# Patient Record
Sex: MALE | Race: WHITE | Employment: OTHER | URBAN - METROPOLITAN AREA
[De-identification: names, ages, dates, MRNs, and addresses within clinical notes are randomized per-mention and may not be internally consistent; named-entity substitution may affect disease eponyms.]

---

## 2017-02-18 PROBLEM — J30.1 SEASONAL ALLERGIC RHINITIS DUE TO POLLEN: Status: ACTIVE | Noted: 2017-02-18

## 2017-03-10 ENCOUNTER — HOSPITAL ENCOUNTER (OUTPATIENT)
Dept: GENERAL RADIOLOGY | Age: 62
Discharge: HOME | End: 2017-03-10
Attending: FAMILY MEDICINE
Payer: COMMERCIAL

## 2017-03-10 ENCOUNTER — HOSPITAL ENCOUNTER (OUTPATIENT)
Dept: CT IMAGING | Age: 62
Discharge: HOME | End: 2017-03-10
Attending: FAMILY MEDICINE
Payer: COMMERCIAL

## 2017-03-10 DIAGNOSIS — G44.209 MUSCLE CONTRACTION HEADACHE: ICD-10-CM

## 2017-03-10 PROCEDURE — 72040 X-RAY EXAM NECK SPINE 2-3 VW: CPT

## 2017-03-10 PROCEDURE — 70450 CT HEAD/BRAIN W/O DYE: CPT

## 2018-02-28 ENCOUNTER — HOSPITAL ENCOUNTER (OUTPATIENT)
Dept: ULTRASOUND IMAGING | Age: 63
Discharge: HOME | End: 2018-02-28
Attending: FAMILY MEDICINE
Payer: COMMERCIAL

## 2018-02-28 ENCOUNTER — HOSPITAL ENCOUNTER (OUTPATIENT)
Dept: CT IMAGING | Age: 63
Discharge: HOME | End: 2018-02-28
Attending: FAMILY MEDICINE
Payer: COMMERCIAL

## 2018-02-28 DIAGNOSIS — H53.2 DIPLOPIA: ICD-10-CM

## 2018-02-28 PROCEDURE — 70450 CT HEAD/BRAIN W/O DYE: CPT

## 2018-02-28 PROCEDURE — 93880 EXTRACRANIAL BILAT STUDY: CPT

## 2018-02-28 NOTE — PROGRESS NOTES
Patient was informed of his lab results and voiced understanding. The patient stated he is interested in setting up for a MRI. I will call imaging on Friday and will set it up. Do we have the order already in his chart?

## 2018-03-16 ENCOUNTER — HOSPITAL ENCOUNTER (OUTPATIENT)
Dept: MRI IMAGING | Age: 63
Discharge: HOME | End: 2018-03-16
Attending: NURSE PRACTITIONER
Payer: COMMERCIAL

## 2018-03-16 DIAGNOSIS — H53.2 DIPLOPIA: ICD-10-CM

## 2018-03-16 PROCEDURE — 70551 MRI BRAIN STEM W/O DYE: CPT

## 2019-04-18 PROBLEM — G43.109 MIGRAINE WITH AURA AND WITHOUT STATUS MIGRAINOSUS, NOT INTRACTABLE: Status: ACTIVE | Noted: 2019-04-18

## 2019-04-18 PROBLEM — L40.9 PSORIASIS: Status: ACTIVE | Noted: 2019-04-18

## 2019-04-25 ENCOUNTER — HOSPITAL ENCOUNTER (OUTPATIENT)
Dept: MRI IMAGING | Age: 64
Discharge: HOME | End: 2019-04-25
Payer: COMMERCIAL

## 2019-04-25 DIAGNOSIS — G43.109 MIGRAINE WITH AURA AND WITHOUT STATUS MIGRAINOSUS, NOT INTRACTABLE: ICD-10-CM

## 2019-04-25 DIAGNOSIS — Q38.5 ABNORMALITY OF PALATE: ICD-10-CM

## 2019-04-25 PROCEDURE — 70544 MR ANGIOGRAPHY HEAD W/O DYE: CPT

## 2019-06-11 PROBLEM — L40.3 PALMOPLANTAR PUSTULAR PSORIASIS: Status: ACTIVE | Noted: 2019-04-18

## 2019-08-13 PROBLEM — N40.1 BPH WITH OBSTRUCTION/LOWER URINARY TRACT SYMPTOMS: Status: ACTIVE | Noted: 2019-08-13

## 2019-08-13 PROBLEM — N13.8 BPH WITH OBSTRUCTION/LOWER URINARY TRACT SYMPTOMS: Status: ACTIVE | Noted: 2019-08-13

## 2020-01-06 PROBLEM — J40 BRONCHITIS: Status: ACTIVE | Noted: 2020-01-06

## 2020-09-17 PROBLEM — G47.30 SLEEP APNEA IN ADULT: Status: ACTIVE | Noted: 2020-09-17

## 2020-12-30 PROBLEM — R40.0 SOMNOLENCE, DAYTIME: Status: ACTIVE | Noted: 2020-12-30

## 2021-02-23 PROBLEM — E66.09 CLASS 1 OBESITY DUE TO EXCESS CALORIES WITH SERIOUS COMORBIDITY AND BODY MASS INDEX (BMI) OF 31.0 TO 31.9 IN ADULT: Status: ACTIVE | Noted: 2021-02-23

## 2021-08-30 PROBLEM — L40.3 PALMOPLANTAR PUSTULAR PSORIASIS: Status: RESOLVED | Noted: 2019-04-18 | Resolved: 2021-08-30

## 2021-09-07 ENCOUNTER — HOSPITAL ENCOUNTER (OUTPATIENT)
Dept: GENERAL RADIOLOGY | Age: 66
Discharge: HOME | End: 2021-09-07
Payer: COMMERCIAL

## 2021-09-07 DIAGNOSIS — M25.561 CHRONIC PAIN OF RIGHT KNEE: ICD-10-CM

## 2021-09-07 DIAGNOSIS — G89.29 CHRONIC PAIN OF RIGHT KNEE: ICD-10-CM

## 2021-09-07 PROBLEM — M17.11 ARTHRITIS OF RIGHT KNEE: Status: ACTIVE | Noted: 2021-09-07

## 2021-09-07 PROCEDURE — 73560 X-RAY EXAM OF KNEE 1 OR 2: CPT

## 2021-10-11 NOTE — THERAPY EVALUATION
Sneha Weiss  : 1955  Payor: Keely Macedo / Plan: [place] OF [address] / Product Type: PPO /  [facility]  [address] [facility], [place] [address] [address] [facility]  [phone]   [phone]        OUTPATIENT PHYSICAL THERAPY:Initial Assessment 10/11/2021    ICD-10: Treatment Diagnosis:   M25.561 Pain in Right Knee  R26.89 Other Abnormalities of Gait  M25.661 Stiffness Right Knee    Precautions/Allergies:   Aspirin free [acetaminophen] and Lisinopril   Fall Risk Score: 2  (? 5 = High Risk)  MD Orders: Eval and Treat MEDICAL/REFERRING DIAGNOSIS:  Arthritis of right knee [M17.11]  Pain and swelling of right knee [M25.561, M25.461]   DATE OF ONSET: 6-8 months  REFERRING PHYSICIAN: Miri Maya*  RETURN PHYSICIAN APPOINTMENT: TBD by pt     ASSESSMENT:  Mr. Елена Hubbard has attended 1 physical therapy session including the initial evaluation. Pt presents with c/o R knee pain secondary to OA. Pt presents with decreased ROM, decreased strength, and associated increased pain affecting participation in functional mobility and recreational activities. Recommending skilled PT: manual therapeutic techniques (as appropriate), therapeutic exercises and activities, balance interventions, and a comprehensive home exercise program to address the current impairments, as listed above. Sneha Weiss will benefit from skilled PT (medically necessary) to address above deficits affecting participation in basic ADLs and overall functional tolerance. PROBLEM LIST (Impacting functional limitations)  1. Decreased Strength  2. Decreased ADL/Functional Activities  3. Decreased Transfer Abilities  4. Decreased Ambulation Ability/Technique  5. Decreased Balance  6. Increased Pain  7. Decreased Flexibility/Joint Mobility  8. Decreased [place] with Home Exercise Program INTERVENTIONS PLANNED:  1. Balance Exercise  2. Cold  3. Cryotherapy  4.  Electrical Stimulation  5. Family Education  6. Gait Training  7. Heat  8. Home Exercise Program (HEP)  9. Manual Therapy  10. Neuromuscular Re-education/Strengthening  11. Range of Motion (ROM)  12. Therapeutic Activites  13. Therapeutic Exercise/Strengthening  14. Transcutaneous Electrical Nerve Stimulation (TENS)  15. Transfer Training  16. Vasopneumatic Device  17. Dry Needling   TREATMENT PLAN:  TREATMENT PLAN:  Effective Dates: 10/12/2021 TO 1/9/2022 (90 days). Frequency/Duration: 2 times a week for 90 Days  GOALS: (Goals have been discussed and agreed upon with patient.)  Discharge Goals: Time Frame: 12 weeks  1. Varinder Virgen will be independent with HEP. 2. Varinder Virgen will report 2/10 pain in order to ascend/descend steps without difficulty  3. Varinder Virgen will demonstrate 0-140 degrees knee ROM in order to return to squatting without difficulty  4. Varinder Virgen will demonstrate 5/5 LE strength in order to return to hiking  1. Varinder Virgen will score 65/80 on LEFS demonstrating overall improvements in functional mobility  Rehabilitation Potential For Stated Goals: Good    Outcome Measure: Tool Used: Lower Extremity Functional Scale (LEFS)  Score:  Initial: 51/80 Most Recent: X/80 (Date: -- )   Interpretation of Score: 20 questions each scored on a 5 point scale with 0 representing \"extreme difficulty or unable to perform\" and 4 representing \"no difficulty\". The lower the score, the greater the functional disability. 80/80 represents no disability. Minimal detectable change is 9 points. Medical Necessity:   · Skilled intervention continues to be required due to decreased strength, ROM, balance, and functional mobility. Reason for Services/Other Comments:  · Patient continues to require skilled intervention due to decreased strength, balance, and ROM with increased pain affecting pt functional mobility.     Regarding Trey Hart's therapy, I certify that the treatment plan above will be carried out by a therapist or under their direction. Thank you for this referral,  Kevin Murillo     Referring Physician Signature: Breanna Flowers*              Date                    The information in this section was collected on 10/12/21 (except where otherwise noted). HISTORY:   History of Present Injury/Illness (Reason for Referral):  Pt arrives today with c/o R knee pain. Pt reports no specific MINH but that pain just progressively worsened over time. He states approximately 3 months ago he began exercises and found some relief with it but still does continue to have pain. It did not resolve completely Pt reports pain is intermittent. Pt reports having increased stiffness (difficulty with both extension and flexion). Reports most pain is medially and does have popping. Pt reports aggravating factors include any physical activity. CC/Primary Concern: R knee pain and stiffness            Treatment Side: Right    Past Medical History/Comorbidities:   Mr. Joao Pacheco  has a past medical history of Essential hypertension, benign, Mild intermittent asthma, and Sleep disturbance. Mr. Joao Pacheco  has no past surgical history on file. Social History/Living Environment:   Lives with wife, 1 story, no steps    Pain/Symptom Location: medial pain and overall joint pain    Worst Pain: 6/10  Current Pain: 0/10    Aggravating Factors: Standing, Walking, Stairs Up, Stairs Down and Sit to stand, squatting    Alleviating Factors/Positions/Motions: Exercising, middle bend (resting position)      Diagnostic Imaging: Xray: showing OA and fluid    Occupation: Working- practice law      Prior Level of Function/Work/Activity:  History of knee pain but nothing that lasted, Independent and relatively painfree    Patient Goals:   Get back normal if possible- as much improvement as possible, return to hiking.      Current Medications:       Current Outpatient Medications:     Armodafinil (NuvigiL) 150 mg tab, 1 po QAM --For Excessive Daytime Somnolence, Disp: 90 Tablet, Rfl: 1    metoprolol succinate (TOPROL-XL) 50 mg XL tablet, Take 1 Tablet by mouth daily. , Disp: 90 Tablet, Rfl: 0    rosuvastatin (CRESTOR) 5 mg tablet, TAKE 1 TABLET BY MOUTH EVERY DAY AT NIGHT, Disp: 90 Tab, Rfl: 3    olmesartan (BENICAR) 40 mg tablet, Take 1 Tab by mouth daily. , Disp: 90 Tab, Rfl: 3    albuterol (PROVENTIL HFA, VENTOLIN HFA, PROAIR HFA) 90 mcg/actuation inhaler, INHALE 2 PUFFS BY MOUTH EVERY 4 HOURS AS NEEDED FOR WHEEZE, Disp: 1 Inhaler, Rfl: 1    topiramate (TOPAMAX) 50 mg tablet, Take 1 Tab by mouth daily. Indications: migraine prevention, Disp: 90 Tab, Rfl: 3    DULoxetine (CYMBALTA) 60 mg capsule, 1 cap daily. , Disp: 90 Cap, Rfl: 3    tamsulosin (FLOMAX) 0.4 mg capsule, TAKE 1 CAPSULE BY MOUTH EVERY DAY  Indications: enlarged prostate with urination problem, Disp: 90 Cap, Rfl: 3   Date Last Reviewed:  10/12/2021       Ambulatory/Rehab Services H2 Model Falls Risk Assessment    Risk Factors:       (1)  Gender [Male] Ability to Rise from Chair:       (1)  Pushes up, successful in one attempt    Falls Prevention Plan:       No modifications necessary   Total: (5 or greater = High Risk): 2    ©2010 [facility] of [address] [place] Patent #[id number].  Federal Law prohibits the replication, distribution or use without written permission from [facility] [employer]        Number of Personal Factors/Comorbidities that affect the Plan of Care: 1-2: MODERATE COMPLEXITY   EXAMINATION:     ________________________________________________________________________________________________  Observation        Gait: Decreased Gait Speed and Decreased Stride Length        Assistive Device: None              Edema: 41.5 cm L, 42.0 cm on R         ________________________________________________________________________________________________  Range of Motion            Lower  Joint: Active Active    Right (Degrees) Left (Degrees)   Hip Flexion [facility]   [facility]   Hip Adduction     Hip Abduction [facility]   Hip Internal Rotation [facility] [place]   Hip External Rotation [facility]   Knee Flexion 125 degrees 145 degrees   Knee Extension -5 degrees 0 degrees           Additional Comments:   ________________________________________________________________________________________________  Strength                 Lower Extremity  Joint:      RIGHT LEFT   Hip Flexion 4+/5 5/5   Hip Extension 4+/5 5/5   Hip Internal Rotation 5/5 5/5   Hip External Rotation 4+/5 5/5   Hip Abduction 4+/5 5/5   Hip Adduction NT/5 NT/5   Knee Flexion 5/5 5/5   Knee Extension 5/5 5/5        Additional Comments:   ________________________________________________________________________________________________  [facility]        C/O Radicular Symptoms: No            Additional Comments:   ________________________________________________________________________________________________  Special Tests      D[place]ora: Negative        Additional Comments:     ________________________________________________________________________________________________  Palpation: No TTP this session  Joint mobilization: Hypomobility noted- R patella all directions    BALANCE Date: 10/12/2021 Date:    Right 30 seconds     Left              Body Structures Involved:    9. Nerves  10. Bones  11. Joints  12. Muscles  13. Ligaments Body Functions Affected:  18. Sensory/Pain  19. Neuromusculoskeletal  20. Movement Related Activities and Participation Affected:  1. General Tasks and Demands  2. Mobility  3. Self Care  4. Domestic Life  5. Interpersonal Interactions and Relationships  6.  Community, Social and [employer] [place]   Number of elements (examined above) that affect the Plan of Care: 4+: HIGH COMPLEXITY   CLINICAL PRESENTATION:   Presentation: Evolving clinical presentation with changing clinical characteristics: MODERATE COMPLEXITY   CLINICAL DECISION MAKING:      Use of outcome tool(s) and clinical judgement create a POC that gives a: Questionable prediction of patient's progress: MODERATE COMPLEXITY

## 2021-10-11 NOTE — PROGRESS NOTES
Gretchen Frost  : 1955  Payor: Le Zhao / [place] OF [address] / Product Type: PPO /  [facility] [facility] [place], [place] [address] [address] [facility]  [phone]   [phone]      OUTPATIENT PHYSICAL THERAPY: Daily Treatment Note 10/12/2021  Visit Count:  1    ICD-10: Treatment Diagnosis:   M25.561 Pain in Right Knee  R26.89 Other Abnormalities of Gait  M25.661 Stiffness Right Knee      Precautions/Allergies:   Aspirin free [acetaminophen] and Lisinopril     TREATMENT PLAN:  Effective Dates: 10/12/2021 TO 2022 (90 days). Frequency/Duration: 2 times a week for 90 Days        PRE-TREATMENT SYMPTOMS/COMPLAINTS:  R knee pain    MEDICATIONS REVIEWED:  10/12/2021   TREATMENT:   (In addition to Assessment/Re-Assessment sessions the following treatments were rendered)    THERAPEUTIC EXERCISE: (0 minutes):  Exercises per grid below to improve mobility, strength and balance. Required minimal visual and verbal cues to promote proper body alignment and promote proper body posture. Progressed resistance, range and complexity of movement as indicated. Date:   Date:   Date:     Activity/Exercise Parameters Parameters Parameters   bridges      ASLR      SAQ      clamshell      S/l hip abduction      Hip adduction      Step downs      Lateral tap downs      Step ups      slantboard      LAQ      SLS Foam             MANUAL THERAPY: (0 minutes): Joint mobilization, Soft tissue mobilization and Manipulation was utilized and necessary because of the patient's restricted joint motion, painful spasm, loss of articular motion and restricted motion of soft tissue.      (Used abbreviations: MET - muscle energy technique; PNF - proprioceptive neuromuscular facilitation; NMR - neuromuscular re-education; AP - anterior to posterior; PA - posterior to anterior)    MODALITIES: (0 minutes):      none      TREATMENT/SESSION ASSESSMENT:  Gretchen Frost verbalized understanding of role of PT and POC. Education: on objective findings and HEP    RECOMMENDATIONS/INTENT FOR NEXT TREATMENT SESSION: \"Next visit will focus on advancements to more challenging activities\".     PAIN: Initial: 0/10 Post Session:  1/10     [employer] Portal    Total Treatment Billable Duration:  PT Patient Time In/Time Out  Time In: 1100  Time Out: 1135  Dmitry Pereira, PT, DPT    Future Appointments   Date Time Provider Jasper Olivera   2/21/2022  8:45 AM [facility]   2/28/2022  8:30 AM Meme Maya PA [facility] P[facility]       Visit Approval Visit # Therapist initials Date A NS / Cx < 24 hr >24 hr Cx Comments    1 ABBIE 10/12/21 [x]  [] [] Initial evaluation       [] [] []        [] [] []        [] [] []        [] [] []        [] [] []        [] [] []        [] [] []        [] [] []        [] [] []        [] [] []        [] [] []        [] [] []        [] [] []        [] [] []        [] [] []        [] [] []        [] [] []

## 2021-10-12 ENCOUNTER — HOSPITAL ENCOUNTER (OUTPATIENT)
Dept: PHYSICAL THERAPY | Age: 66
Discharge: HOME | End: 2021-10-12
Attending: PHYSICIAN ASSISTANT
Payer: COMMERCIAL

## 2021-10-12 DIAGNOSIS — M25.561 PAIN AND SWELLING OF RIGHT KNEE: ICD-10-CM

## 2021-10-12 DIAGNOSIS — M17.11 ARTHRITIS OF RIGHT KNEE: ICD-10-CM

## 2021-10-12 DIAGNOSIS — M25.461 PAIN AND SWELLING OF RIGHT KNEE: ICD-10-CM

## 2021-10-12 PROCEDURE — 97162 PT EVAL MOD COMPLEX 30 MIN: CPT

## 2021-11-03 ENCOUNTER — HOSPITAL ENCOUNTER (OUTPATIENT)
Dept: PHYSICAL THERAPY | Age: 66
Discharge: HOME | End: 2021-11-03
Attending: PHYSICIAN ASSISTANT
Payer: COMMERCIAL

## 2021-11-03 PROCEDURE — 97110 THERAPEUTIC EXERCISES: CPT

## 2021-11-03 PROCEDURE — 97140 MANUAL THERAPY 1/> REGIONS: CPT

## 2021-11-03 NOTE — PROGRESS NOTES
Gretchen Jamesconcepción  : 1955  Payor: Le Zhao / [place] OF [address] / Product Type: PPO /  [facility]rapy [facility] at [facility]  [address], [place] [address] [address] [facility]  [phone]   [phone]      OUTPATIENT PHYSICAL THERAPY: Daily Treatment Note 11/3/2021  Visit Count:  2    ICD-10: Treatment Diagnosis:   M25.561 Pain in Right Knee  R26.89 Other Abnormalities of Gait  M25.661 Stiffness Right Knee      Precautions/Allergies:   Aspirin free [acetaminophen] and Lisinopril     TREATMENT PLAN:  Effective Dates: 10/12/2021 TO 2022 (90 days). Frequency/Duration: 2 times a week for 90 Days        PRE-TREATMENT SYMPTOMS/COMPLAINTS:  Pt reports knee pain mostly at medial aspect today. MEDICATIONS REVIEWED:  11/3/2021   TREATMENT:   (In addition to Assessment/Re-Assessment sessions the following treatments were rendered)    THERAPEUTIC EXERCISE: (25 minutes):  Exercises per grid below to improve mobility, strength and balance. Required minimal visual and verbal cues to promote proper body alignment and promote proper body posture. Progressed resistance, range and complexity of movement as indicated.      Date:  11/3/21 Date:   Date:     Activity/Exercise Parameters Parameters Parameters   bridges 10 X 2     ASLR 10 X 2     SAQ #2 10 X 2     clamshell 10 X 2     S/l hip abduction 10 X 2     Hip adduction 10 X 2     Step downs      Lateral tap downs      Step ups      slantboard 30 seconds 3 X      LAQ #2 10 X 2     SLS  Foam    Heel raises      Reverse clamshell 10 X 2     Standing HS Curl        MANUAL THERAPY: (15 minutes): Joint mobilization, Soft tissue mobilization and Manipulation was utilized and necessary because of the patient's restricted joint motion, painful spasm, loss of articular motion and restricted motion of soft tissue.   +manual stretching R HS, ITB Quad, hip flexor  +PROM knee flexion, extension with overpressure  +Gentle posterior glide to R tibia for improved extension    (Used abbreviations: MET - muscle energy technique; PNF - proprioceptive neuromuscular facilitation; NMR - neuromuscular re-education; AP - anterior to posterior; PA - posterior to anterior)    MODALITIES: (0 minutes):      none      TREATMENT/SESSION ASSESSMENT:  Tolerated addition of exercises well. Requiring VC and TC for technique to prevent compensations. Assess response next visit and add weight/resistance as indicated. Education: on objective findings and HEP    RECOMMENDATIONS/INTENT FOR NEXT TREATMENT SESSION: \"Next visit will focus on advancements to more challenging activities\".     PAIN: Initial: 1-2/10 Post Session:  1/10     [employer] Portal    Total Treatment Billable Duration:  PT Patient Time In/Time Out  Time In: 1105  Time Out: 1145  [place]Ã³[place]as Clawson, PT, DPT    Future Appointments   Date Time Provider Jasper Olivera   11/5/2021 11:00 AM Roxanne Delatorre PTA [facility]   11/10/2021 11:00 AM Hebron Black A [facility]   11/12/2021 11:00 AM Hebron Black A [facility]PT SFD   11/17/2021 11:00 AM Hebron Black A [facility]ORPT SFD   2/21/2022  8:45 AM PV[facility] LAB [facility]   2/28/2022  8:30 AM Jennifer Maya PA [facility] P[facility]       Visit Approval Visit # Therapist initials Date A NS / Cx < 24 hr >24 hr Cx Comments    1 ABBIE 10/12/21 [x]  [] [] Initial evaluation    2 ABBIE 11/3/21 [x] [] []        [] [] []        [] [] []        [] [] []        [] [] []        [] [] []        [] [] []        [] [] []        [] [] []        [] [] []        [] [] []        [] [] []        [] [] []        [] [] []        [] [] []        [] [] []        [] [] []

## 2021-11-05 ENCOUNTER — HOSPITAL ENCOUNTER (OUTPATIENT)
Dept: PHYSICAL THERAPY | Age: 66
Discharge: HOME | End: 2021-11-05
Attending: PHYSICIAN ASSISTANT
Payer: COMMERCIAL

## 2021-11-05 PROCEDURE — 97140 MANUAL THERAPY 1/> REGIONS: CPT

## 2021-11-05 PROCEDURE — 97110 THERAPEUTIC EXERCISES: CPT

## 2021-11-05 NOTE — PROGRESS NOTES
Dany Montes  : 1955  Payor: Yaima Barkley / [place] OF [address] / Product Type: PPO /  [facility]  [address], [place] [address] [address] [facility]  [phone]   [phone]      OUTPATIENT PHYSICAL THERAPY: Daily Treatment Note 2021  Visit Count:  3    ICD-10: Treatment Diagnosis:   M25.561 Pain in Right Knee  R26.89 Other Abnormalities of Gait  M25.661 Stiffness Right Knee      Precautions/Allergies:   Aspirin free [acetaminophen] and Lisinopril     TREATMENT PLAN:  Effective Dates: 10/12/2021 TO 2022 (90 days). Frequency/Duration: 2 times a week for 90 Days        PRE-TREATMENT SYMPTOMS/COMPLAINTS:  Pt reports knee pain mostly at medial aspect today. MEDICATIONS REVIEWED:  2021   TREATMENT:   (In addition to Assessment/Re-Assessment sessions the following treatments were rendered)    THERAPEUTIC EXERCISE: (30 minutes):  Exercises per grid below to improve mobility, strength and balance. Required minimal visual and verbal cues to promote proper body alignment and promote proper body posture. Progressed resistance, range and complexity of movement as indicated.      Date:  11/3/21 Date:  21 Date:     Activity/Exercise Parameters Parameters Parameters   bridges 10 X 2 2 x 10    ASLR 10 X 2 2 x 10     SAQ #2 10 X 2 2#  X 10     clamshell 10 X 2 2 x 10     S/l hip abduction 10 X 2 2 x 10     Hip adduction 10 X 2     Step downs      Lateral tap downs      Step ups      slantboard 30 seconds 3 X  3 x 30 sec hold     LAQ #2 10 X 2 2# 2 x 10    SLS  Foam  3 x 15 sec hold     Heel raises      Reverse clamshell 10 X 2     Tandem  stance  3 x 15-20 sec with each foot forward on blue foam.     Standing HS Curl        MANUAL THERAPY: (12 minutes): Joint mobilization, Soft tissue mobilization and Manipulation was utilized and necessary because of the patient's restricted joint motion, painful spasm, loss of articular motion and restricted motion of soft tissue.   +manual stretching R ITB, Quad, hip flexor  +PROM knee flexion, extension with overpressure  +Gentle posterior glide to R tibia for improved extension - NOT TODAY    (Used abbreviations: MET - muscle energy technique; PNF - proprioceptive neuromuscular facilitation; NMR - neuromuscular re-education; AP - anterior to posterior; PA - posterior to anterior)    MODALITIES: (0 minutes):      none  Encouraged to use once she was home . TREATMENT/SESSION ASSESSMENT:  Patient a little frustrated with balance activities. of exercises well. Requiring VC and TC for technique to prevent compensations. Assess response next visit and add weight/resistance as indicated. Education: on objective findings and HEP    RECOMMENDATIONS/INTENT FOR NEXT TREATMENT SESSION: \"Next visit will focus on advancements to more challenging activities\".     PAIN: Initial: 1-2/10 Post Session:  1/10     [employer] Portal    Total Treatment Billable Duration: 42 minutes   PT Patient Time In/Time Out  Time In: 1058  Time Out: [address] [facility], \[facility] [place] [facility]\""    Future Appointments   Date Time Provider Jasper Olivera   11/10/2021 11:00 AM [facility]   11/12/2021 11:00 AM Maris MENDIOLA [facility]PT SFD   11/17/2021 11:00 AM Maris Pickard A [facility]PT SFD   2/21/2022  8:45 AM PVF LAB [facility]   2/28/2022  8:30 AM Salas Maya PA [facility] P[facility]       Visit Approval Visit # Therapist initials Date A NS / Cx < 24 hr >24 hr Cx Comments    1 ABBIE 10/12/21 [x]  [] [] Initial evaluation    2 ABBIE 11/3/21 [x] [] []     3 PDE 11-5-2021 [x] [] []        [] [] []        [] [] []        [] [] []        [] [] []        [] [] []        [] [] []        [] [] []        [] [] []        [] [] []        [] [] []        [] [] []        [] [] []        [] [] []        [] [] []        [] [] []

## 2021-11-10 ENCOUNTER — HOSPITAL ENCOUNTER (OUTPATIENT)
Dept: PHYSICAL THERAPY | Age: 66
Discharge: HOME | End: 2021-11-10
Attending: PHYSICIAN ASSISTANT
Payer: COMMERCIAL

## 2021-11-10 PROCEDURE — 97140 MANUAL THERAPY 1/> REGIONS: CPT

## 2021-11-10 PROCEDURE — 97110 THERAPEUTIC EXERCISES: CPT

## 2021-11-10 NOTE — PROGRESS NOTES
Vika Storm  : 1955  Payor: Pro Ricci / [place]: [place] OF [address] / Product Type: PPO /  [id number]  at [facility]  [address] [facility], [place] [address] [address] [facility]  [phone]   [phone]      OUTPATIENT PHYSICAL THERAPY: Daily Treatment Note 11/10/2021  Visit Count:  4    ICD-10: Treatment Diagnosis:   M25.561 Pain in Right Knee  R26.89 Other Abnormalities of Gait  M25.661 Stiffness Right Knee      Precautions/Allergies:   Aspirin free [acetaminophen] and Lisinopril     TREATMENT PLAN:  Effective Dates: 10/12/2021 TO 2022 (90 days). Frequency/Duration: 2 times a week for 90 Days        PRE-TREATMENT SYMPTOMS/COMPLAINTS:  Pt reports his knee is might be feeling a little better but definitely not worse. MEDICATIONS REVIEWED:  11/10/2021   TREATMENT:   (In addition to Assessment/Re-Assessment sessions the following treatments were rendered)    THERAPEUTIC EXERCISE: (35 minutes):  Exercises per grid below to improve mobility, strength and balance. Required minimal visual and verbal cues to promote proper body alignment and promote proper body posture. Progressed resistance, range and complexity of movement as indicated.      Date:  11/3/21 Date:  21 Date:  11/10/21   Activity/Exercise Parameters Parameters Parameters   bridges 10 X 2 2 x 10 15 X 2   ASLR 10 X 2 2 x 10  #2 10 X 2    SAQ #2 10 X 2 2#  X 10  #3 10 X 2   clamshell 10 X 2 2 x 10  GTB 10 X 2   S/l hip abduction 10 X 2 2 x 10  10 X 2   Hip adduction 10 X 2  15 X 2   Step downs   6\" 10 X   Lateral tap downs   4\" 10 X   Step ups   4\"10 X   slantboard 30 seconds 3 X  3 x 30 sec hold  30 seconds 3 X   LAQ #2 10 X 2 2# 2 x 10 #3 10 X 2   SLS  Foam  3 x 15 sec hold  Foam 3 X w/ head turns   Heel raises      Reverse clamshell 10 X 2  #2 10 X 2   Tandem  stance  3 x 15-20 sec with each foot forward on blue foam.     Standing HS Curl        MANUAL THERAPY: (10 minutes): Joint mobilization, Soft tissue mobilization and Manipulation was utilized and necessary because of the patient's restricted joint motion, painful spasm, loss of articular motion and restricted motion of soft tissue.   +manual stretching R ITB, Quad, hip flexor  +PROM knee flexion, extension with overpressure  +Gentle posterior glide to R tibia for improved extension     (Used abbreviations: MET - muscle energy technique; PNF - proprioceptive neuromuscular facilitation; NMR - neuromuscular re-education; AP - anterior to posterior; PA - posterior to anterior)    MODALITIES: (0 minutes):      none  Encouraged to use once she was home . TREATMENT/SESSION ASSESSMENT:  Added step exercises this session with pt requiring VC and TC to prevent knee valgus and compensations. Added head turns with SLS with significant instability noted. Education: on objective findings and HEP    RECOMMENDATIONS/INTENT FOR NEXT TREATMENT SESSION: \"Next visit will focus on advancements to more challenging activities\".     PAIN: Initial: 0/10 Post Session:  0/10     [employer] Portal    Total Treatment Billable Duration:   PT Patient Time In/Time Out  Time In: 1100  Time Out: [facility]    Future Appointments   Date Time Provider Jasper Olivera   11/12/2021 11:00 AM Jessica MENDIOLA [facility]   11/17/2021 11:00 AM Jessica MENDIOLA [facility]   2/21/2022  8:45 AM PVF LAB [facility] PVD   2/28/2022  8:30 AM Bere Maya PA [facility] PVD       Visit Approval Visit # Therapist initials Date A NS / Cx < 24 hr >24 hr Cx Comments    1 ABBIE 10/12/21 [x]  [] [] Initial evaluation    2 ABBIE 11/3/21 [x] [] []     3 PDE 11-5-2021 [x] [] []     4 ABBIE 11/10/21 [x] [] []        [] [] []        [] [] []        [] [] []        [] [] []        [] [] []        [] [] []        [] [] []        [] [] []        [] [] []        [] [] []        [] [] []        [] [] []        [] [] []        [] [] []

## 2021-11-12 ENCOUNTER — HOSPITAL ENCOUNTER (OUTPATIENT)
Dept: PHYSICAL THERAPY | Age: 66
Discharge: HOME | End: 2021-11-12
Attending: PHYSICIAN ASSISTANT
Payer: COMMERCIAL

## 2021-11-12 PROCEDURE — 97110 THERAPEUTIC EXERCISES: CPT

## 2021-11-12 PROCEDURE — 97140 MANUAL THERAPY 1/> REGIONS: CPT

## 2021-11-12 NOTE — PROGRESS NOTES
Stephanie Query  : 1955  Payor: Wen García / [place] [address] / Product Type: PPO /  Therapy Center at [facility]  [address] [facility], [place] [address] [address] [facility]  [phone]   [phone]      OUTPATIENT PHYSICAL THERAPY: Daily Treatment Note 2021  Visit Count:  5    ICD-10: Treatment Diagnosis:   M25.561 Pain in Right Knee  R26.89 Other Abnormalities of Gait  M25.661 Stiffness Right Knee      Precautions/Allergies:   Aspirin free [acetaminophen] and Lisinopril     TREATMENT PLAN:  Effective Dates: 10/12/2021 TO 2022 (90 days). Frequency/Duration: 2 times a week for 90 Days        PRE-TREATMENT SYMPTOMS/COMPLAINTS:  Pt reports he feels like he is starting to get an improvement; states the step ups seemed to help last time    MEDICATIONS REVIEWED:  2021   TREATMENT:   (In addition to Assessment/Re-Assessment sessions the following treatments were rendered)    THERAPEUTIC EXERCISE: (28 minutes):  Exercises per grid below to improve mobility, strength and balance. Required minimal visual and verbal cues to promote proper body alignment and promote proper body posture. Progressed resistance, range and complexity of movement as indicated.      Date:  11/3/21 Date:  21 Date:  11/10/21 Date:  2021   Activity/Exercise Parameters Parameters Parameters    bridges 10 X 2 2 x 10 15 X 2 20 reps/1 set    ASLR 10 X 2 2 x 10  #2 10 X 2  2 lb ankle weight; 15 reps/1 set R LE; cues to move slower eccentrically   SAQ #2 10 X 2 2#  X 10  #3 10 X 2    clamshell 10 X 2 2 x 10  GTB 10 X 2    S/l hip abduction 10 X 2 2 x 10  10 X 2 1.5 ankle weight; 15 reps/1 set R LE; cues for correct alignment at LE and slow movement   Hip adduction 10 X 2  15 X 2    Step downs   6\" 10 X Off of 6 inch step; 15 reps/1 set leading with L (to increase demand at R LE) with medial patellar taping at R LE; minimal to no UE support   Lateral tap downs   4\" 10 X Step ups   4\"10 X Onto 6 inch step; 15 reps/1 set leading with R LE with medial patellar taping at R knee; minimal to no UE support   slantboard 30 seconds 3 X  3 x 30 sec hold  30 seconds 3 X    LAQ #2 10 X 2 2# 2 x 10 #3 10 X 2    SLS  Foam  3 x 15 sec hold  Foam 3 X w/ head turns    Heel raises       Reverse clamshell 10 X 2  #2 10 X 2    Tandem  stance  3 x 15-20 sec with each foot forward on blue foam.      Standing HS Curl       Prone hip extension    10 reps/1 set R LE; cues to maintain hips at table to avoid substitution   Prone knee flexion    2 lb resistance; 20 reps/1 set R LE             MANUAL THERAPY: (14 minutes): Joint mobilization, Soft tissue mobilization and Manipulation was utilized and necessary because of the patient's restricted joint motion, painful spasm, loss of articular motion and restricted motion of soft tissue.   +manual stretching R ITB, Quad, hip flexor - NOT PERFORMED on 11/12/2021  +PROM knee flexion, extension with overpressure  +Gentle posterior glide to R tibia for improved extension   +STM and trigger point release to R quad   +mediopatellar taping (with leukotape and hypafix) to R knee to improve R knee alignment    (Used abbreviations: MET - muscle energy technique; PNF - proprioceptive neuromuscular facilitation; NMR - neuromuscular re-education; AP - anterior to posterior; PA - posterior to anterior)    MODALITIES: (0 minutes):      none  Pt declined    TREATMENT/SESSION ASSESSMENT:  Performed Hopson's test this session and pt had less pain with resisted knee flexion at multiple angles when medial patellar glide was performed. Taped pt's knee into medial patellar glide to assist with alignment when performing functional mobility - pt reported decreased pain when performing step ups/downs when knee was taped. Instructed pt that he can continue to keep tape on as long as his R knee doesn't hurt.  Discussed with pt that if tape continues to help he can discuss with primary therapist next session on how to continue to improve his patellar alignment. Education: on objective findings and HEP    RECOMMENDATIONS/INTENT FOR NEXT TREATMENT SESSION: \"Next visit will focus on advancements to more challenging activities\".     PAIN: Initial: 0-1/10 Post Session:  0/10     [employer] Portal    Total Treatment Billable Duration:   PT Patient Time In/Time Out  Time In: 1100  Time Out: 1  Sh[place]ley Yusuf, DPT    Future Appointments   Date Time Provider [place]   11/17/2021 11:00 AM Fatemeh Gory A [facility] S[facility]   11/19/2021  8:45 AM Dewey Gan, [facility] SFD   11/23/2021  8:45 AM Dewey Gan, [facility] SFD   12/1/2021 11:00 AM Fatemeh Gory A [facility] SFD   12/3/2021 11:00 AM Fatemeh Gory A [facility] SFD   2/21/2022  8:45 AM PVF LAB [facility] P[facility]   2/28/2022  8:30 AM Joyce Maya, PA [facility] PVD       Visit Approval Visit # Therapist initials Date A NS / Cx < 24 hr >24 hr Cx Comments    1 ABBIE 10/12/21 [x]  [] [] Initial evaluation    2 ABBIE 11/3/21 [x] [] []     3 PDE 11-5-2021 [x] [] []     4 ABBIE 11/10/21 [x] [] []     5  11/12/21 [x] [] []        [] [] []        [] [] []        [] [] []        [] [] []        [] [] []        [] [] []        [] [] []        [] [] []        [] [] []        [] [] []        [] [] []        [] [] []        [] [] []

## 2021-11-17 ENCOUNTER — HOSPITAL ENCOUNTER (OUTPATIENT)
Dept: PHYSICAL THERAPY | Age: 66
Discharge: HOME | End: 2021-11-17
Attending: PHYSICIAN ASSISTANT
Payer: COMMERCIAL

## 2021-11-17 PROCEDURE — 97140 MANUAL THERAPY 1/> REGIONS: CPT

## 2021-11-17 PROCEDURE — 97110 THERAPEUTIC EXERCISES: CPT

## 2021-11-17 NOTE — PROGRESS NOTES
Milton Perera  : 1955  Payor: Garry Lui / [place] [address] / Product Type: PPO /  Therapy Center at [address] [place] [address] [address], [address] [facility], [place] [address] [address] [facility]  [phone]   [phone]      OUTPATIENT PHYSICAL THERAPY: Daily Treatment Note 2021  Visit Count:  6    ICD-10: Treatment Diagnosis:   M25.561 Pain in Right Knee  R26.89 Other Abnormalities of Gait  M25.661 Stiffness Right Knee      Precautions/Allergies:   Aspirin free [acetaminophen] and Lisinopril     TREATMENT PLAN:  Effective Dates: 10/12/2021 TO 2022 (90 days). Frequency/Duration: 2 times a week for 90 Days        PRE-TREATMENT SYMPTOMS/COMPLAINTS:    MEDICATIONS REVIEWED:  2021   TREATMENT:   (In addition to Assessment/Re-Assessment sessions the following treatments were rendered)    THERAPEUTIC EXERCISE: (25 minutes):  Exercises per grid below to improve mobility, strength and balance. Required minimal visual and verbal cues to promote proper body alignment and promote proper body posture. Progressed resistance, range and complexity of movement as indicated.      Date:  11/3/21 Date:  21 Date:  11/10/21 Date:  2021 Date:  21   Activity/Exercise Parameters Parameters Parameters     bridges 10 X 2 2 x 10 15 X 2 20 reps/1 set  15 X 2 W/ 10 lb weight at waist   ASLR 10 X 2 2 x 10  #2 10 X 2  2 lb ankle weight; 15 reps/1 set R LE; cues to move slower eccentrically #3 10 X 2    SAQ #2 10 X 2 2#  X 10  #3 10 X 2  #3 15 X 2   clamshell 10 X 2 2 x 10  GTB 10 X 2  GTB 10 X 2   S/l hip abduction 10 X 2 2 x 10  10 X 2 1.5 ankle weight; 15 reps/1 set R LE; cues for correct alignment at LE and slow movement #2 10 X 2   Hip adduction 10 X 2  15 X 2     Step downs   6\" 10 X Off of 6 inch step; 15 reps/1 set leading with L (to increase demand at R LE) with medial patellar taping at R LE; minimal to no UE support 6\" 10 X 1 UE support   Lateral tap downs   4\" 10 X Step ups   4\"10 X Onto 6 inch step; 15 reps/1 set leading with R LE with medial patellar taping at R knee; minimal to no UE support 6\" 10 X no UE support   slantboard 30 seconds 3 X  3 x 30 sec hold  30 seconds 3 X  30 seconds 3 X level 3    LAQ #2 10 X 2 2# 2 x 10 #3 10 X 2     SLS  Foam  3 x 15 sec hold  Foam 3 X w/ head turns     Heel raises        Reverse clamshell 10 X 2  #2 10 X 2     Tandem  stance  3 x 15-20 sec with each foot forward on blue foam.       Prone hip extension    10 reps/1 set R LE; cues to maintain hips at table to avoid substitution    Prone knee flexion    2 lb resistance; 20 reps/1 set R LE  #2 10 X 2 R             MANUAL THERAPY: (15 minutes): Joint mobilization, Soft tissue mobilization and Manipulation was utilized and necessary because of the patient's restricted joint motion, painful spasm, loss of articular motion and restricted motion of soft tissue.   +manual stretching R ITB, Quad, hip flexor   +PROM knee flexion, extension with overpressure  +Gentle posterior glide to R tibia for improved extension   +STM and trigger point release to R quad   +mediopatellar taping (with leukotape and hypafix) to R knee to improve R knee alignment    (Used abbreviations: MET - muscle energy technique; PNF - proprioceptive neuromuscular facilitation; NMR - neuromuscular re-education; AP - anterior to posterior; PA - posterior to anterior)    MODALITIES: (0 minutes):      none  Pt declined    TREATMENT/SESSION ASSESSMENT:  Tolerated progression of exercises well. Much improvement noted with knee alignment and taps downs to prevent knee valgus. Education: on objective findings and HEP    RECOMMENDATIONS/INTENT FOR NEXT TREATMENT SESSION: \"Next visit will focus on advancements to more challenging activities\".     PAIN: Initial: 0/10 Post Session:  0/10     [employer] Portal    Total Treatment Billable Duration:   PT Patient Time In/Time Out  Time In: 1100  Time Out: [address]    Future Appointments   Date Time Provider Jasper Joselin   11/19/2021  8:45 AM Sonny Davis [facility]   11/23/2021  8:45 AM Sonny Davis [facility] S[facility]   12/1/2021 11:00 AM Leslie Mosqueda A [facility] SFD   12/3/2021 11:00 AM Leslie Mosqueda A [facility] SFD   2/21/2022  8:45 AM PVF LAB [facility] P[facility]   2/28/2022  8:30 AM Meme Maya PA [facility] PVD       Visit Approval Visit # Therapist initials Date A NS / Cx < 24 hr >24 hr Cx Comments    1 ABBIE 10/12/21 [x]  [] [] Initial evaluation    2 ABBIE 11/3/21 [x] [] []     3 PDE 11-5-2021 [x] [] []     4 ABBIE 11/10/21 [x] [] []     5  11/12/21 [x] [] []     6 ABBIE 11/17/21 [x] [] []        [] [] []        [] [] []        [] [] []        [] [] []        [] [] []        [] [] []        [] [] []        [] [] []        [] [] []        [] [] []        [] [] []        [] [] []

## 2021-11-19 ENCOUNTER — HOSPITAL ENCOUNTER (OUTPATIENT)
Dept: PHYSICAL THERAPY | Age: 66
Discharge: HOME | End: 2021-11-19
Attending: PHYSICIAN ASSISTANT
Payer: COMMERCIAL

## 2021-11-19 PROCEDURE — 97110 THERAPEUTIC EXERCISES: CPT

## 2021-11-19 PROCEDURE — 97140 MANUAL THERAPY 1/> REGIONS: CPT

## 2021-11-19 NOTE — PROGRESS NOTES
1-1.9 cm Gabriel Stanley  : 1955  Payor: Gaudencio Zavala / [place] [address] / Product Type: PPO /  Therapy Center at [address] [facility] [address] [facility], [place] [address] [address] [facility]  [phone]   [phone]      OUTPATIENT PHYSICAL THERAPY: Daily Treatment Note 2021  Visit Count:  7    ICD-10: Treatment Diagnosis:   M25.561 Pain in Right Knee  R26.89 Other Abnormalities of Gait  M25.661 Stiffness Right Knee      Precautions/Allergies:   Aspirin free [acetaminophen] and Lisinopril     TREATMENT PLAN:  Effective Dates: 10/12/2021 TO 2022 (90 days). Frequency/Duration: 2 times a week for 90 Days        PRE-TREATMENT SYMPTOMS/COMPLAINTS:    MEDICATIONS REVIEWED:  2021   TREATMENT:   (In addition to Assessment/Re-Assessment sessions the following treatments were rendered)    THERAPEUTIC EXERCISE: (25 minutes):  Exercises per grid below to improve mobility, strength and balance. Required minimal visual and verbal cues to promote proper body alignment and promote proper body posture. Progressed resistance, range and complexity of movement as indicated.      Date:  21 Date:  11/10/21 Date:  2021 Date:  21 Date:  2021   Activity/Exercise Parameters Parameters      bridges 2 x 10 15 X 2 20 reps/1 set  15 X 2 W/ 10 lb weight at waist 2 x 15   10# wt at waist    ASLR 2 x 10  #2 10 X 2  2 lb ankle weight; 15 reps/1 set R LE; cues to move slower eccentrically #3 10 X 2  3#  2 x 15 R   SAQ 2#  X 10  #3 10 X 2  #3 15 X 2 3#   2 x 15 R   clamshell 2 x 10  GTB 10 X 2  GTB 10 X 2 GTB  2 x 15 R   S/l hip abduction 2 x 10  10 X 2 1.5 ankle weight; 15 reps/1 set R LE; cues for correct alignment at LE and slow movement #2 10 X 2 2#  2 x 15 R   Hip adduction  15 X 2      Step downs  6\" 10 X Off of 6 inch step; 15 reps/1 set leading with L (to increase demand at R LE) with medial patellar taping at R LE; minimal to no UE support 6\" 10 X 1 UE support Lateral tap downs  4\" 10 X      Step ups  4\"10 X Onto 6 inch step; 15 reps/1 set leading with R LE with medial patellar taping at R knee; minimal to no UE support 6\" 10 X no UE support    slantboard 3 x 30 sec hold  30 seconds 3 X  30 seconds 3 X level 3  3 x 30 sec hold    LAQ 2# 2 x 10 #3 10 X 2      SLS Foam  3 x 15 sec hold  Foam 3 X w/ head turns      Heel raises        Reverse clamshell  #2 10 X 2      Tandem  stance 3 x 15-20 sec with each foot forward on blue foam.        Prone hip extension   10 reps/1 set R LE; cues to maintain hips at table to avoid substitution  Knee bent   X 10 R  Knee straight   X 10 R   Prone knee flexion   2 lb resistance; 20 reps/1 set R LE  #2 10 X 2 R 2#   2 x 15 R             MANUAL THERAPY: (15 minutes): Joint mobilization, Soft tissue mobilization and Manipulation was utilized and necessary because of the patient's restricted joint motion, painful spasm, loss of articular motion and restricted motion of soft tissue.   +manual stretching R ITB, Quad, hip flexor   +PROM knee flexion, extension with overpressure  +Gentle posterior glide to R tibia for improved extension  - NOT TODAY  +STM and trigger point release to R quad in supine and R hamstring in prone. +mediopatellar taping (with leukotape and hypafix) to R knee to improve R knee alignment - NOT TODAY    (Used abbreviations: MET - muscle energy technique; PNF - proprioceptive neuromuscular facilitation; NMR - neuromuscular re-education; AP - anterior to posterior; PA - posterior to anterior)    MODALITIES: (0 minutes):      none  Pt declined. Encouraged him to use ice at home as he needs it for inflammation and edema. TREATMENT/SESSION ASSESSMENT:  Patient had decrease in edema pocket on medial patella region. He was able to increase reps with no difficulty as noted in grid above.      Education: on objective findings and HEP    RECOMMENDATIONS/INTENT FOR NEXT TREATMENT SESSION: \"Next visit will focus on advancements to more challenging activities\".     PAIN: Initial: 0/10 Post Session:  0/10     [employer] Portal    Total Treatment Billable Duration: 40 minutes   PT Patient Time In/Time Out  Time In: 0093 (Patient 5 minutes late )  Time Out: [address] PTA    Future Appointments   Date Time Provider Jasper Olivera   11/23/2021  8:45 AM Annia Jung PTA [facility]   12/1/2021 11:00 AM Aloha Lingo A [facility] SFD   12/3/2021 11:00 AM [place] Lingo A [facility] SFD   2/21/2022  8:45 AM PVF LAB [facility] PVD   2/28/2022  8:30 AM Abiodun Maay PA [facility] PVD       Visit Approval Visit # Therapist initials Date A NS / Cx < 24 hr >24 hr Cx Comments    1 ABBIE 10/12/21 [x]  [] [] Initial evaluation    2 ABBIE 11/3/21 [x] [] []     3 PDE 11-5-2021 [x] [] []     4 ABBIE 11/10/21 [x] [] []     5  11/12/21 [x] [] []     6 ABBIE 11/17/21 [x] [] []     7 PDE 11-19-21 [x] [] []        [] [] []        [] [] []        [] [] []        [] [] []        [] [] []        [] [] []        [] [] []        [] [] []        [] [] []        [] [] []        [] [] [] Greater than or equal to 4 cm

## 2021-11-23 ENCOUNTER — HOSPITAL ENCOUNTER (OUTPATIENT)
Dept: PHYSICAL THERAPY | Age: 66
Discharge: HOME | End: 2021-11-23
Attending: PHYSICIAN ASSISTANT
Payer: COMMERCIAL

## 2021-11-23 PROCEDURE — 97110 THERAPEUTIC EXERCISES: CPT

## 2021-11-23 PROCEDURE — 97140 MANUAL THERAPY 1/> REGIONS: CPT

## 2021-11-23 NOTE — PROGRESS NOTES
Sneha Weiss  : 1955  Payor: Keely Macedo / [place] [address] / Product Type: PPO /  Therapy Center at [facility]  [address], [place] [address] [address] [facility]  [phone]   [phone]      OUTPATIENT PHYSICAL THERAPY: Daily Treatment Note 2021  Visit Count:  8    ICD-10: Treatment Diagnosis:   M25.561 Pain in Right Knee  R26.89 Other Abnormalities of Gait  M25.661 Stiffness Right Knee      Precautions/Allergies:   Aspirin free [acetaminophen] and Lisinopril     TREATMENT PLAN:  Effective Dates: 10/12/2021 TO 2022 (90 days). Frequency/Duration: 2 times a week for 90 Days        PRE-TREATMENT SYMPTOMS/COMPLAINTS:    MEDICATIONS REVIEWED:  2021   TREATMENT:   (In addition to Assessment/Re-Assessment sessions the following treatments were rendered)    THERAPEUTIC EXERCISE: (32 minutes):  Exercises per grid below to improve mobility, strength and balance. Required minimal visual and verbal cues to promote proper body alignment and promote proper body posture. Progressed resistance, range and complexity of movement as indicated.      Date:  11/10/21 Date:  2021 Date:  21 Date:  2021 Date:  2021   Activity/Exercise Parameters       bridges 15 X 2 20 reps/1 set  15 X 2 W/ 10 lb weight at waist 2 x 15   10# wt at waist  2 x 15   10# at waist    ASLR #2 10 X 2  2 lb ankle weight; 15 reps/1 set R LE; cues to move slower eccentrically #3 10 X 2  3#  2 x 15 R 3#  2 x 15 R   SAQ #3 10 X 2  #3 15 X 2 3#   2 x 15 R 3#  2 x 15 R   clamshell GTB 10 X 2  GTB 10 X 2 GTB  2 x 15 R GTB  2 x 15 R   S/l hip abduction 10 X 2 1.5 ankle weight; 15 reps/1 set R LE; cues for correct alignment at LE and slow movement #2 10 X 2 2#  2 x 15 R 3#  2 x 10 R   Hip adduction 15 X 2       Step downs 6\" 10 X Off of 6 inch step; 15 reps/1 set leading with L (to increase demand at R LE) with medial patellar taping at R LE; minimal to no UE support 6\" 10 X 1 UE support     Lateral tap downs 4\" 10 X       Step ups 4\"10 X Onto 6 inch step; 15 reps/1 set leading with R LE with medial patellar taping at R knee; minimal to no UE support 6\" 10 X no UE support     slantboard 30 seconds 3 X  30 seconds 3 X level 3  3 x 30 sec hold  3 x 30 sec hold    LAQ #3 10 X 2       SLS Foam 3 X w/ head turns       Heel raises        Reverse clamshell #2 10 X 2       Tandem  stance        Prone hip extension  10 reps/1 set R LE; cues to maintain hips at table to avoid substitution  Knee bent   X 10 R  Knee straight   X 10 R Knee bent   X 10 R  Knee straight   X 10 R   Prone knee flexion  2 lb resistance; 20 reps/1 set R LE  #2 10 X 2 R 2#   2 x 15 R 3#   2 X 10 R   Balance board      A/P & M/L   Tapping x 20   And static hold      MANUAL THERAPY: (10 minutes): Joint mobilization, Soft tissue mobilization and Manipulation was utilized and necessary because of the patient's restricted joint motion, painful spasm, loss of articular motion and restricted motion of soft tissue.   +manual stretching R ITB, Quad, hip flexor - NOT TODAY  +PROM knee flexion, extension with overpressure  +Gentle posterior glide to R tibia for improved extension  - NOT TODAY  +STM and trigger point release to R quad in supine   +mediopatellar taping (with leukotape and hypafix) to R knee to improve R knee alignment - NOT TODAY    (Used abbreviations: MET - muscle energy technique; PNF - proprioceptive neuromuscular facilitation; NMR - neuromuscular re-education; AP - anterior to posterior; PA - posterior to anterior)    MODALITIES: (0 minutes):      none  Pt declined. Encouraged him to use ice at home as he needs it for inflammation and edema. TREATMENT/SESSION ASSESSMENT:  Patient able to perform balance board activities with no major difficulty. He reports that was a good workout. Patient able to increase weight with certain exercises as noted in grid.       Education: on objective findings and HEP    RECOMMENDATIONS/INTENT FOR NEXT TREATMENT SESSION: \"Next visit will focus on advancements to more challenging activities\".     PAIN: Initial: 0/10 Post Session:  0/10     [employer] Portal    Total Treatment Billable Duration: 42 minutes   PT Patient Time In/Time Out  Time In: [address], PTA    Future Appointments   Date Time Provider Jasper Joselin   12/1/2021 11:00 AM [facility]   12/3/2021 11:00 AM João MENDIOLA [facility] SFD   2/21/2022  8:45 AM PVF LAB [facility] PVD   2/28/2022  8:30 AM Zulma, [place], PA SSA PVF PVD       Visit Approval Visit # Therapist initials Date A NS / Cx < 24 hr >24 hr Cx Comments    1 ABBIE 10/12/21 [x]  [] [] Initial evaluation    2 ABBIE 11/3/21 [x] [] []     3 PDE 11-5-2021 [x] [] []     4 ABBIE 11/10/21 [x] [] []     5  11/12/21 [x] [] []     6 ABBIE 11/17/21 [x] [] []     7 PDE 11-19-21 [x] [] []     8 PDE 11-23-21 [x] [] []        [] [] []        [] [] []        [] [] []        [] [] []        [] [] []        [] [] []        [] [] []        [] [] []        [] [] []        [] [] []

## 2021-12-01 ENCOUNTER — HOSPITAL ENCOUNTER (OUTPATIENT)
Dept: PHYSICAL THERAPY | Age: 66
Discharge: HOME | End: 2021-12-01
Attending: PHYSICIAN ASSISTANT
Payer: COMMERCIAL

## 2021-12-01 PROCEDURE — 97110 THERAPEUTIC EXERCISES: CPT

## 2021-12-01 PROCEDURE — 97140 MANUAL THERAPY 1/> REGIONS: CPT

## 2021-12-01 NOTE — PROGRESS NOTES
Dany Montes  : 1955  Payor: Yaima Barkley / Plan: [place] BLUE CROSS OF [address] / Product Type: PPO /  [address] [place]  at [address] [facility] [address], [address], [facility], [address] [facility]  [phone]   [phone]      OUTPATIENT PHYSICAL THERAPY: Daily Treatment Note 2021  Visit Count:  9    ICD-10: Treatment Diagnosis:   M25.561 Pain in Right Knee  R26.89 Other Abnormalities of Gait  M25.661 Stiffness Right Knee      Precautions/Allergies:   Aspirin free [acetaminophen] and Lisinopril     TREATMENT PLAN:  Effective Dates: 10/12/2021 TO 2022 (90 days). Frequency/Duration: 2 times a week for 90 Days        PRE-TREATMENT SYMPTOMS/COMPLAINTS:  Pt reports he hasn't done much as far as exercises over the holiday weekend. MEDICATIONS REVIEWED:  2021   TREATMENT:   (In addition to Assessment/Re-Assessment sessions the following treatments were rendered)    THERAPEUTIC EXERCISE: (30 minutes):  Exercises per grid below to improve mobility, strength and balance. Required minimal visual and verbal cues to promote proper body alignment and promote proper body posture. Progressed resistance, range and complexity of movement as indicated.      Date:  11/10/21 Date:  2021 Date:  21 Date:  2021 Date:  2021 Date:  21   Activity/Exercise Parameters        bridges 15 X 2 20 reps/1 set  15 X 2 W/ 10 lb weight at waist 2 x 15   10# wt at waist  2 x 15   10# at waist  #10 at waist 15 X 2   ASLR #2 10 X 2  2 lb ankle weight; 15 reps/1 set R LE; cues to move slower eccentrically #3 10 X 2  3#  2 x 15 R 3#  2 x 15 R #4 10 X 2 R    SAQ #3 10 X 2  #3 15 X 2 3#   2 x 15 R 3#  2 x 15 R    clamshell GTB 10 X 2  GTB 10 X 2 GTB  2 x 15 R GTB  2 x 15 R BTB 15 X 2 R    S/l hip abduction 10 X 2 1.5 ankle weight; 15 reps/1 set R LE; cues for correct alignment at LE and slow movement #2 10 X 2 2#  2 x 15 R 3#  2 x 10 R #3 10 X 2 R    Hip adduction 15 X 2 Step downs 6\" 10 X Off of 6 inch step; 15 reps/1 set leading with L (to increase demand at R LE) with medial patellar taping at R LE; minimal to no UE support 6\" 10 X 1 UE support   6\" 10 X - no UE support    Lateral tap downs 4\" 10 X        Step ups 4\"10 X Onto 6 inch step; 15 reps/1 set leading with R LE with medial patellar taping at R knee; minimal to no UE support 6\" 10 X no UE support      slantboard 30 seconds 3 X  30 seconds 3 X level 3  3 x 30 sec hold  3 x 30 sec hold     LAQ #3 10 X 2        SLS Foam 3 X w/ head turns     Foam 2 X with head movements   Heel raises         Reverse clamshell #2 10 X 2        Prone hip extension  10 reps/1 set R LE; cues to maintain hips at table to avoid substitution  Knee bent   X 10 R  Knee straight   X 10 R Knee bent   X 10 R  Knee straight   X 10 R    Prone knee flexion  2 lb resistance; 20 reps/1 set R LE  #2 10 X 2 R 2#   2 x 15 R 3#   2 X 10 R #3 10 X 2 R    Wall squats         Leg press      #70 10 X 2 DL, #15 5X SL- with very slow eccentric control                      Balance board      A/P & M/L   Tapping x 20   And static hold       MANUAL THERAPY: (10 minutes): Joint mobilization, Soft tissue mobilization and Manipulation was utilized and necessary because of the patient's restricted joint motion, painful spasm, loss of articular motion and restricted motion of soft tissue.   +manual stretching R ITB, Quad, hip flexor , HS        (Used abbreviations: MET - muscle energy technique; PNF - proprioceptive neuromuscular facilitation; NMR - neuromuscular re-education; AP - anterior to posterior; PA - posterior to anterior)    MODALITIES: (0 minutes):      none  Pt declined. Encouraged him to use ice at home as he needs it for inflammation and edema. TREATMENT/SESSION ASSESSMENT: Tolerated progression of exercise well. No increased knee pain. Pt with poor balance with head movements.  Pt continues with difficulty with eccentric control and knee proprioception. Education: on objective findings and HEP    RECOMMENDATIONS/INTENT FOR NEXT TREATMENT SESSION: \"Next visit will focus on advancements to more challenging activities\".     PAIN: Initial: 1/10 Post Session:  0/10     [employer] Portal    Total Treatment Billable Duration:   PT Patient Time In/Time Out  Time In: 1100  Time Out: [place]    Future Appointments   Date Time Provider J[place] Joselin   12/3/2021 11:00 AM Ronda MENDIOLA [facility]   2/21/2022  8:45 AM [facility]   2/28/2022  8:30 AM Chika Maya PA [facility]       Visit Approval Visit # Therapist initials Date A NS / Cx < 24 hr >24 hr Cx Comments    1 ABBIE 10/12/21 [x]  [] [] Initial evaluation    2 ABBIE 11/3/21 [x] [] []     3 PDE 11-5-2021 [x] [] []     4 ABBIE 11/10/21 [x] [] []     5  11/12/21 [x] [] []     6 ABBIE 11/17/21 [x] [] []     7 PDE 11-19-21 [x] [] []     8 PDE 11-23-21 [x] [] []     9 ABBIE 12/1/21 [x] [] [] PN due next visit       [] [] []        [] [] []        [] [] []        [] [] []        [] [] []        [] [] []        [] [] []        [] [] []        [] [] []

## 2021-12-03 ENCOUNTER — APPOINTMENT (OUTPATIENT)
Dept: PHYSICAL THERAPY | Age: 66
End: 2021-12-03
Attending: PHYSICIAN ASSISTANT
Payer: COMMERCIAL

## 2021-12-08 ENCOUNTER — HOSPITAL ENCOUNTER (OUTPATIENT)
Dept: PHYSICAL THERAPY | Age: 66
Discharge: HOME | End: 2021-12-08
Attending: PHYSICIAN ASSISTANT
Payer: COMMERCIAL

## 2021-12-08 PROCEDURE — 97140 MANUAL THERAPY 1/> REGIONS: CPT

## 2021-12-08 PROCEDURE — 97110 THERAPEUTIC EXERCISES: CPT

## 2021-12-08 NOTE — PROGRESS NOTES
Nelda Shoulders  : 1955  Payor: Adama Chaudhry / [place]: [place]OSS OF [address] / Product Type: PPO /  [facility] Center at [facility]  [address] [address], [address], [place] [address] [address] [facility]  [phone]   [phone]      OUTPATIENT PHYSICAL THERAPY: Daily Treatment Note 2021  Visit Count:  10    ICD-10: Treatment Diagnosis:   M25.561 Pain in Right Knee  R26.89 Other Abnormalities of Gait  M25.661 Stiffness Right Knee      Precautions/Allergies:   Aspirin free [acetaminophen] and Lisinopril     TREATMENT PLAN:  Effective Dates: 10/12/2021 TO 2022 (90 days). Frequency/Duration: 2 times a week for 90 Days        PRE-TREATMENT SYMPTOMS/COMPLAINTS:  Pt reports he did have some increased pain at R knee earlier this week. Pt reports feeling improvements in pain and strength as well as balance since starting PT. MEDICATIONS REVIEWED:  2021   TREATMENT:   (In addition to Assessment/Re-Assessment sessions the following treatments were rendered)    THERAPEUTIC EXERCISE: (30 minutes):  Exercises per grid below to improve mobility, strength and balance. Required minimal visual and verbal cues to promote proper body alignment and promote proper body posture. Progressed resistance, range and complexity of movement as indicated.      Date:  11/10/21 Date:  2021 Date:  21 Date:  2021 Date:  2021 Date:  21 Date:  21   Activity/Exercise Parameters         bridges 15 X 2 20 reps/1 set  15 X 2 W/ 10 lb weight at waist 2 x 15   10# wt at waist  2 x 15   10# at waist  #10 at waist 15 X 2 #10 at waist 15 X 2   ASLR #2 10 X 2  2 lb ankle weight; 15 reps/1 set R LE; cues to move slower eccentrically #3 10 X 2  3#  2 x 15 R 3#  2 x 15 R #4 10 X 2 R  #4 15 X 2    SAQ #3 10 X 2  #3 15 X 2 3#   2 x 15 R 3#  2 x 15 R     clamshell GTB 10 X 2  GTB 10 X 2 GTB  2 x 15 R GTB  2 x 15 R BTB 15 X 2 R  BTB 15 X 2 R   S/l hip abduction 10 X 2 1.5 ankle weight; 15 reps/1 set R LE; cues for correct alignment at LE and slow movement #2 10 X 2 2#  2 x 15 R 3#  2 x 10 R #3 10 X 2 R  #3 15 X 2 R    Hip adduction 15 X 2         Step downs 6\" 10 X Off of 6 inch step; 15 reps/1 set leading with L (to increase demand at R LE) with medial patellar taping at R LE; minimal to no UE support 6\" 10 X 1 UE support   6\" 10 X - no UE support     Lateral tap downs 4\" 10 X         Step ups 4\"10 X Onto 6 inch step; 15 reps/1 set leading with R LE with medial patellar taping at R knee; minimal to no UE support 6\" 10 X no UE support       slantboard 30 seconds 3 X  30 seconds 3 X level 3  3 x 30 sec hold  3 x 30 sec hold      LAQ #3 10 X 2         SLS Foam 3 X w/ head turns     Foam 2 X with head movements Foam 3 X w/ head movements   Heel raises          Reverse clamshell #2 10 X 2         Prone hip extension  10 reps/1 set R LE; cues to maintain hips at table to avoid substitution  Knee bent   X 10 R  Knee straight   X 10 R Knee bent   X 10 R  Knee straight   X 10 R     Prone knee flexion  2 lb resistance; 20 reps/1 set R LE  #2 10 X 2 R 2#   2 x 15 R 3#   2 X 10 R #3 10 X 2 R  #4 15 X 2 R    Wall squats          Leg press      #70 10 X 2 DL, #15 5X SL- with very slow eccentric control  #70 10 X 2 DL, #15 5X SL with very slow eccentric control   Balance board      A/P & M/L   Tapping x 20   And static hold        MANUAL THERAPY: (10 minutes): Joint mobilization, Soft tissue mobilization and Manipulation was utilized and necessary because of the patient's restricted joint motion, painful spasm, loss of articular motion and restricted motion of soft tissue.   +manual stretching R ITB, Quad, hip flexor , HS        (Used abbreviations: MET - muscle energy technique; PNF - proprioceptive neuromuscular facilitation; NMR - neuromuscular re-education; AP - anterior to posterior; PA - posterior to anterior)    MODALITIES: (0 minutes):      none  Pt declined.    Encouraged him to use ice at home as he needs it for inflammation and edema. TREATMENT/SESSION ASSESSMENT: Pt is making good steady progress towards goals with improvements in strength, ROM, and balance. Focus of remaining sessions to eccentric knee control and proprioception. Education: on objective findings and HEP    RECOMMENDATIONS/INTENT FOR NEXT TREATMENT SESSION: \"Next visit will focus on advancements to more challenging activities\".     PAIN: Initial: 1/10 Post Session:  0/10 (mm fatigue)     [employer] Portal    Total Treatment Billable Duration:   PT Patient Time In/Time Out  Time In: 1100  Time Out: [address]    Future Appointments   Date Time Provider [place]   12/15/2021  9:30 AM Saurabh Merry A [facility]   12/17/2021  9:30 AM Saurabh Merry A [facility]   12/21/2021 10:15 AM Saurabh Merry A [facility]   12/28/2021  9:30 AM Saurabh Merry A [facility]   12/30/2021  1:00 PM Saurabh Merry A [facility]   1/5/2022 10:15 AM Saurabh Merry A [facility]   1/7/2022  9:30 AM Saurabh Merry A [facility]   2/21/2022  8:45 AM [facility]   2/28/2022  8:30 AM Ugo Maya PA [facility]       Visit Approval Visit # Therapist initials Date A NS / Cx < 24 hr >24 hr Cx Comments    1 ABBIE 10/12/21 [x]  [] [] Initial evaluation    2 ABBIE 11/3/21 [x] [] []     3 PDE 11-5-2021 [x] [] []     4 ABBIE 11/10/21 [x] [] []     5  11/12/21 [x] [] []     6 ABBIE 11/17/21 [x] [] []     7 PDE 11-19-21 [x] [] []     8 PDE 11-23-21 [x] [] []     9 ABBIE 12/1/21 [x] [] [] PN due next visit    10 ABBIE 12/8/21 [x] [] [] PN today       [] [] []        [] [] []        [] [] []        [] [] []        [] [] []        [] [] []        [] [] []        [] [] []

## 2021-12-08 NOTE — THERAPY EVALUATION
Lo Vega  : 1955  Payor: Roxana Barraza / Plan: [place] CROSS OF [address] / Product Type: PPO /  [address]  [facility], [address] [facility], [address]  [phone]   [phone]        OUTPATIENT PHYSICAL THERAPY:Progress Report 2021    ICD-10: Treatment Diagnosis:   M25.561 Pain in Right Knee  R26.89 Other Abnormalities of Gait  M25.661 Stiffness Right Knee    Precautions/Allergies:   Aspirin free [acetaminophen] and Lisinopril   Fall Risk Score: 2  (? 5 = High Risk)  MD Orders: Eval and Treat MEDICAL/REFERRING DIAGNOSIS:  Unilateral primary osteoarthritis, right knee [M17.11]  Pain in right knee [M25.561]  Effusion, right knee [M25.461]   DATE OF ONSET: 6-8 months  REFERRING PHYSICIAN: Miri Maya*  RETURN PHYSICIAN APPOINTMENT: TBD by pt   Progress Report: Pt has been seen for a total of 10 PT sessions to date. Pt demonstrating improvements in strength, balance and pain. Pt will continue to benefit from skilled PT interventions with focus on knee proprioception with balance and eccentric quad strength. ASSESSMENT:  Mr. Noreen Sadler has attended 1 physical therapy session including the initial evaluation. Pt presents with c/o R knee pain secondary to OA. Pt presents with decreased ROM, decreased strength, and associated increased pain affecting participation in functional mobility and recreational activities. Recommending skilled PT: manual therapeutic techniques (as appropriate), therapeutic exercises and activities, balance interventions, and a comprehensive home exercise program to address the current impairments, as listed above. Lo Vega will benefit from skilled PT (medically necessary) to address above deficits affecting participation in basic ADLs and overall functional tolerance. PROBLEM LIST (Impacting functional limitations)  1. Decreased Strength  2. Decreased ADL/Functional Activities  3.  Decreased Transfer Abilities  4. Decreased Ambulation Ability/Technique  5. Decreased Balance  6. Increased Pain  7. Decreased Flexibility/Joint Mobility  8. Decreased [place] with Home Exercise Program INTERVENTIONS PLANNED:  1. Balance Exercise  2. Cold  3. Cryotherapy  4. Electrical Stimulation  5. Family Education  6. Gait Training  7. Heat  8. Home Exercise Program (HEP)  9. Manual Therapy  10. Neuromuscular Re-education/Strengthening  11. Range of Motion (ROM)  12. Therapeutic Activites  13. Therapeutic Exercise/Strengthening  14. Transcutaneous Electrical Nerve Stimulation (TENS)  15. Transfer Training  16. Vasopneumatic Device  17. Dry Needling   TREATMENT PLAN:  TREATMENT PLAN:  Effective Dates: 10/12/2021 TO 1/9/2022 (90 days). Frequency/Duration: 2 times a week for 90 Days  GOALS: (Goals have been discussed and agreed upon with patient.)  Discharge Goals: Time Frame: 12 weeks  1. Vika Storm will be independent with HEP. Goal met 12/8/21  2. Vika Storm will report 2/10 pain in order to ascend/descend steps without difficulty Progressing 12/8/21 (3/10)  3. Vika Storm will demonstrate 0-140 degrees knee ROM in order to return to squatting without difficulty Progressing 12/8/21  4. Vika Storm will demonstrate 5/5 LE strength in order to return to hiking Goal met 12/8/21  1. Vika Storm will score 65/80 on LEFS demonstrating overall improvements in functional mobility Progressing 12/8/21  Rehabilitation Potential For Stated Goals: Good    Outcome Measure: Tool Used: Lower Extremity Functional Scale (LEFS)  Score:  Initial: 51/80 Most Recent: 5280 (Date: 12/8/21 )   Interpretation of Score: 20 questions each scored on a 5 point scale with 0 representing \"extreme difficulty or unable to perform\" and 4 representing \"no difficulty\". The lower the score, the greater the functional disability. 80/80 represents no disability. Minimal detectable change is 9 points.       Medical Necessity: · Skilled intervention continues to be required due to decreased strength, ROM, balance, and functional mobility. Reason for Services/Other Comments:  · Patient continues to require skilled intervention due to decreased strength, balance, and ROM with increased pain affecting pt functional mobility. Regarding Trey Hart's therapy, I certify that the treatment plan above will be carried out by a therapist or under their direction. Thank you for this referral,  Tosin Nails     Referring Physician Signature: Manda Payne*            The information in this section was collected on 10/12/21 (except where otherwise noted). HISTORY:   History of Present Injury/Illness (Reason for Referral):  Pt arrives today with c/o R knee pain. Pt reports no specific MINH but that pain just progressively worsened over time. He states approximately 3 months ago he began exercises and found some relief with it but still does continue to have pain. It did not resolve completely Pt reports pain is intermittent. Pt reports having increased stiffness (difficulty with both extension and flexion). Reports most pain is medially and does have popping. Pt reports aggravating factors include any physical activity. CC/Primary Concern: R knee pain and stiffness            Treatment Side: Right    Past Medical History/Comorbidities:   Mr. Yesenia Navarro  has a past medical history of Essential hypertension, benign, Mild intermittent asthma, and Sleep disturbance. Mr. Yesenia Navarro  has no past surgical history on file.   Social History/Living Environment:   Lives with wife, 1 story, no steps    Pain/Symptom Location: medial pain and overall joint pain    Worst Pain: 6/10  Current Pain: 0/10    Aggravating Factors: Standing, Walking, Stairs Up, Stairs Down and Sit to stand, squatting    Alleviating Factors/Positions/Motions: Exercising, middle bend (resting position)      Diagnostic Imaging: Xray: showing OA and fluid    Occupation: Working- practice law      Prior Level of Function/Work/Activity:  History of knee pain but nothing that lasted, Independent and relatively painfree    Patient Goals:   Get back normal if possible- as much improvement as possible, return to hiking. Current Medications:       Current Outpatient Medications:     Armodafinil (NuvigiL) 150 mg tab, 1 po QAM --For Excessive Daytime Somnolence, Disp: 90 Tablet, Rfl: 1    metoprolol succinate (TOPROL-XL) 50 mg XL tablet, Take 1 Tablet by mouth daily. , Disp: 90 Tablet, Rfl: 0    rosuvastatin (CRESTOR) 5 mg tablet, TAKE 1 TABLET BY MOUTH EVERY DAY AT NIGHT, Disp: 90 Tab, Rfl: 3    olmesartan (BENICAR) 40 mg tablet, Take 1 Tab by mouth daily. , Disp: 90 Tab, Rfl: 3    albuterol (PROVENTIL HFA, VENTOLIN HFA, PROAIR HFA) 90 mcg/actuation inhaler, INHALE 2 PUFFS BY MOUTH EVERY 4 HOURS AS NEEDED FOR WHEEZE, Disp: 1 Inhaler, Rfl: 1    topiramate (TOPAMAX) 50 mg tablet, Take 1 Tab by mouth daily. Indications: migraine prevention, Disp: 90 Tab, Rfl: 3    DULoxetine (CYMBALTA) 60 mg capsule, 1 cap daily. , Disp: 90 Cap, Rfl: 3    tamsulosin (FLOMAX) 0.4 mg capsule, TAKE 1 CAPSULE BY MOUTH EVERY DAY  Indications: enlarged prostate with urination problem, Disp: 90 Cap, Rfl: 3   Date Last Reviewed:  12/8/2021       Ambulatory/Rehab Services H2 Model Falls Risk Assessment    Risk Factors:       (1)  Gender [Male] Ability to Rise from Chair:       (1)  Pushes up, successful in one attempt    Falls Prevention Plan:       No modifications necessary   Total: (5 or greater = High Risk): 2    ©2010 [facility] [place] [address] [place] [address] Patent #[id number].  Federal Law prohibits the replication, distribution or use without written permission from [facility]        Number of Personal Factors/Comorbidities that affect the Plan of Care: 1-2: MODERATE COMPLEXITY   EXAMINATION: ________________________________________________________________________________________________  Observation        Gait: Decreased Gait Speed and Decreased Stride Length        Assistive Device: None              Edema: 41.5 cm L, 42.0 cm on R         ________________________________________________________________________________________________  Range of Motion            Lower  Joint: Active Active Active Right 12/8/21    Right (Degrees) Left (Degrees)    Hip Flexion [facility]    [facility]ip Extension [facility]    Hip Adduction      Hip Abduction [place]    Hip Internal Rotation [place] [place]    Hip External Rotation [place]    Knee Flexion 125 degrees 145 degrees 132 degrees   Knee Extension -5 degrees 0 degrees -5 degrees           Additional Comments:   ________________________________________________________________________________________________  Strength                 Lower Extremity  Joint:   Right 12/8/21    RIGHT LEFT    Hip Flexion 4+/5 5/5 5/5    Hip Extension 4+/5 5/5 5/5   Hip Internal Rotation 5/5 5/5 5/5   Hip External Rotation 4+/5 5/5 5/5   Hip Abduction 4+/5 5/5 5/5   Hip Adduction NT/5 NT/5    Knee Flexion 5/5 5/5 5/5   Knee Extension 5/5 5/5 5/5        Additional Comments:   ________________________________________________________________________________________________  [facility]er[facility]        C/O Radicular Symptoms: No            Additional Comments:   ________________________________________________________________________________________________  Special Tests      [place]: Negative        Additional Comments:     ________________________________________________________________________________________________  Palpation: No TTP this session  Joint mobilization: Hypomobility noted- R patella all directions    BALANCE Date: 10/12/2021 Date: 12/8/21   Right 30 seconds  Dynamic  Balance- up to 5 seconds with visual involement   Left              Body Structures Involved:    9. Nerves  10. Bones  11. Joints  12. Muscles  13. Ligaments Body Functions Affected:  18. Sensory/Pain  19. Neuromusculoskeletal  20. Movement Related Activities and Participation Affected:  1. General Tasks and Demands  2. Mobility  3. Self Care  4. Domestic Life  5. Interpersonal Interactions and Relationships  6.  Community, Social and [place]   Number of elements (examined above) that affect the Plan of Care: 4+: HIGH COMPLEXITY   CLINICAL PRESENTATION:   Presentation: Evolving clinical presentation with changing clinical characteristics: MODERATE COMPLEXITY   CLINICAL DECISION MAKING:      Use of outcome tool(s) and clinical judgement create a POC that gives a: Questionable prediction of patient's progress: MODERATE COMPLEXITY

## 2021-12-15 ENCOUNTER — HOSPITAL ENCOUNTER (OUTPATIENT)
Dept: PHYSICAL THERAPY | Age: 66
Discharge: HOME | End: 2021-12-15
Attending: PHYSICIAN ASSISTANT
Payer: COMMERCIAL

## 2021-12-15 PROCEDURE — 97110 THERAPEUTIC EXERCISES: CPT

## 2021-12-15 PROCEDURE — 97140 MANUAL THERAPY 1/> REGIONS: CPT

## 2021-12-15 NOTE — PROGRESS NOTES
Inez Counts  : 1955  Payor: Andrew Oconnell / [place] OF [address] / Product Type: PPO /  [facility]  [address], [place] [address] [address] [facility]  [phone]   [phone]      OUTPATIENT PHYSICAL THERAPY: Daily Treatment Note 12/15/2021  Visit Count:  11    ICD-10: Treatment Diagnosis:   M25.561 Pain in Right Knee  R26.89 Other Abnormalities of Gait  M25.661 Stiffness Right Knee      Precautions/Allergies:   Aspirin free [acetaminophen] and Lisinopril     TREATMENT PLAN:  Effective Dates: 10/12/2021 TO 2022 (90 days). Frequency/Duration: 2 times a week for 90 Days        PRE-TREATMENT SYMPTOMS/COMPLAINTS:  Pt reports no pain at his knee and that it has been feeling really good. MEDICATIONS REVIEWED:  12/15/2021   TREATMENT:   (In addition to Assessment/Re-Assessment sessions the following treatments were rendered)    THERAPEUTIC EXERCISE: (30 minutes):  Exercises per grid below to improve mobility, strength and balance. Required minimal visual and verbal cues to promote proper body alignment and promote proper body posture. Progressed resistance, range and complexity of movement as indicated.      Date:  11/10/21 Date:  2021 Date:  21 Date:  2021 Date:  2021 Date:  21 Date:  21 Date:  12/15/21   Activity/Exercise Parameters          bridges 15 X 2 20 reps/1 set  15 X 2 W/ 10 lb weight at waist 2 x 15   10# wt at waist  2 x 15   10# at waist  #10 at waist 15 X 2 #10 at waist 15 X 2 #10 at waist 30 X    ASLR #2 10 X 2  2 lb ankle weight; 15 reps/1 set R LE; cues to move slower eccentrically #3 10 X 2  3#  2 x 15 R 3#  2 x 15 R #4 10 X 2 R  #4 15 X 2  #4 15 X 2   clamshell GTB 10 X 2  GTB 10 X 2 GTB  2 x 15 R GTB  2 x 15 R BTB 15 X 2 R  BTB 15 X 2 R BTB 15 X 2   S/l hip abduction 10 X 2 1.5 ankle weight; 15 reps/1 set R LE; cues for correct alignment at LE and slow movement #2 10 X 2 2#  2 x 15 R 3#  2 x 10 R #3 10 X 2 R  #3 15 X 2 R  #4 10 X 2   Step downs 6\" 10 X Off of 6 inch step; 15 reps/1 set leading with L (to increase demand at R LE) with medial patellar taping at R LE; minimal to no UE support 6\" 10 X 1 UE support   6\" 10 X - no UE support   6\" 10 X - no UE support   Step ups 4\"10 X Onto 6 inch step; 15 reps/1 set leading with R LE with medial patellar taping at R knee; minimal to no UE support 6\" 10 X no UE support        slantboard 30 seconds 3 X  30 seconds 3 X level 3  3 x 30 sec hold  3 x 30 sec hold       SLS Foam 3 X w/ head turns     Foam 2 X with head movements Foam 3 X w/ head movements Foam 3 X w/ head movements   Prone knee flexion  2 lb resistance; 20 reps/1 set R LE  #2 10 X 2 R 2#   2 x 15 R 3#   2 X 10 R #3 10 X 2 R  #4 15 X 2 R  #4 15 X 2    Wall squats           Leg press      #70 10 X 2 DL, #15 5X SL- with very slow eccentric control  #70 10 X 2 DL, #15 5X SL with very slow eccentric control #80 10 X 2 DL, #15 5 X SL with slow eccentric control     MANUAL THERAPY: (10 minutes): Joint mobilization, Soft tissue mobilization and Manipulation was utilized and necessary because of the patient's restricted joint motion, painful spasm, loss of articular motion and restricted motion of soft tissue.   +manual stretching R ITB, Quad, hip flexor , HS        (Used abbreviations: MET - muscle energy technique; PNF - proprioceptive neuromuscular facilitation; NMR - neuromuscular re-education; AP - anterior to posterior; PA - posterior to anterior)    MODALITIES: (0 minutes):      none  Pt declined. Encouraged him to use ice at home as he needs it for inflammation and edema. TREATMENT/SESSION ASSESSMENT: Much improved proprioception and control with tap downs at R knee today. Tolerated progression of exercises well.      Education: on objective findings and HEP    RECOMMENDATIONS/INTENT FOR NEXT TREATMENT SESSION: \"Next visit will focus on advancements to more challenging activities\".     PAIN: Initial: 0/10 Post Session:  0/10 (mm fatigue)     MedBridge Portal    Total Treatment Billable Duration:   PT Patient Time In/Time Out  Time In: 0930  Time Out: [address]    Future Appointments   Date Time Provider [place]   12/17/2021  9:30 AM Veleta Horowitz A [facility]   12/21/2021 10:15 AM Veleta Horowitz A [facility]   12/28/2021  9:30 AM Veleta Horowitz A [facility]   12/30/2021  1:00 PM Veleta Horowitz A [facility]   1/5/2022 10:15 AM Veleta Horowitz A [facility]   1/7/2022  9:30 AM Veleta Horowitz A [facility]   2/21/2022  8:45 AM PV[facility]   2/28/2022  8:30 AM Kiara Maya PA [facility]       Visit Approval Visit # Therapist initials Date A NS / Cx < 24 hr >24 hr Cx Comments    1 [facility] 10/12/21 [x]  [] [] Initial evaluation    2 ABBIE 11/3/21 [x] [] []     3 PDE 11-5-2021 [x] [] []     4 ABBIE 11/10/21 [x] [] []     5  11/12/21 [x] [] []     6 ABBIE 11/17/21 [x] [] []     7 PDE 11-19-21 [x] [] []     8 PDE 11-23-21 [x] [] []     9 ABBIE 12/1/21 [x] [] [] PN due next visit    10 ABBIE 12/8/21 [x] [] [] PN today    11 ABBIE 12/15/21 [x] [] [] 1       [] [] []        [] [] []        [] [] []        [] [] []        [] [] []        [] [] []        [] [] []

## 2021-12-17 ENCOUNTER — HOSPITAL ENCOUNTER (OUTPATIENT)
Dept: PHYSICAL THERAPY | Age: 66
Discharge: HOME | End: 2021-12-17
Attending: PHYSICIAN ASSISTANT
Payer: COMMERCIAL

## 2021-12-17 PROCEDURE — 97140 MANUAL THERAPY 1/> REGIONS: CPT

## 2021-12-17 PROCEDURE — 97110 THERAPEUTIC EXERCISES: CPT

## 2021-12-17 NOTE — PROGRESS NOTES
Brit Lazo  : 1955  Payor: Johan Doss / [place] [address] / Product Type: PPO /  [facility]  [address], [address], [place] [address] [address] [facility]  [phone]   [phone]      OUTPATIENT PHYSICAL THERAPY: Daily Treatment Note 2021  Visit Count:  12    ICD-10: Treatment Diagnosis:   M25.561 Pain in Right Knee  R26.89 Other Abnormalities of Gait  M25.661 Stiffness Right Knee      Precautions/Allergies:   Aspirin free [acetaminophen] and Lisinopril     TREATMENT PLAN:  Effective Dates: 10/12/2021 TO 2022 (90 days). Frequency/Duration: 2 times a week for 90 Days        PRE-TREATMENT SYMPTOMS/COMPLAINTS:  Pt reports slight dull aching pain at R knee today. MEDICATIONS REVIEWED:  2021   TREATMENT:   (In addition to Assessment/Re-Assessment sessions the following treatments were rendered)    THERAPEUTIC EXERCISE: (30 minutes):  Exercises per grid below to improve mobility, strength and balance. Required minimal visual and verbal cues to promote proper body alignment and promote proper body posture. Progressed resistance, range and complexity of movement as indicated.      Date:  11/10/21 Date:  2021 Date:  21 Date:  2021 Date:  2021 Date:  21 Date:  21 Date:  12/15/21 Date:  21   Activity/Exercise Parameters           bridges 15 X 2 20 reps/1 set  15 X 2 W/ 10 lb weight at waist 2 x 15   10# wt at waist  2 x 15   10# at waist  #10 at waist 15 X 2 #10 at waist 15 X 2 #10 at waist 30 X  #10 at waist 30 X   ASLR #2 10 X 2  2 lb ankle weight; 15 reps/1 set R LE; cues to move slower eccentrically #3 10 X 2  3#  2 x 15 R 3#  2 x 15 R #4 10 X 2 R  #4 15 X 2  #4 15 X 2 #4 15 X 2   clamshell GTB 10 X 2  GTB 10 X 2 GTB  2 x 15 R GTB  2 x 15 R BTB 15 X 2 R  BTB 15 X 2 R BTB 15 X 2 BTB 15 X 2   S/l hip abduction 10 X 2 1.5 ankle weight; 15 reps/1 set R LE; cues for correct alignment at LE and slow movement #2 10 X 2 2#  2 x 15 R 3#  2 x 10 R #3 10 X 2 R  #3 15 X 2 R  #4 10 X 2 #4 15 X 2   Step downs 6\" 10 X Off of 6 inch step; 15 reps/1 set leading with L (to increase demand at R LE) with medial patellar taping at R LE; minimal to no UE support 6\" 10 X 1 UE support   6\" 10 X - no UE support   6\" 10 X - no UE support 6\" 10 X no UE support    Step ups 4\"10 X Onto 6 inch step; 15 reps/1 set leading with R LE with medial patellar taping at R knee; minimal to no UE support 6\" 10 X no UE support         slantboard 30 seconds 3 X  30 seconds 3 X level 3  3 x 30 sec hold  3 x 30 sec hold        SLS Foam 3 X w/ head turns     Foam 2 X with head movements Foam 3 X w/ head movements Foam 3 X w/ head movements Foam 3 X w/ head movements   Prone knee flexion  2 lb resistance; 20 reps/1 set R LE  #2 10 X 2 R 2#   2 x 15 R 3#   2 X 10 R #3 10 X 2 R  #4 15 X 2 R  #4 15 X 2  #4 15 X 2   Wall squats         10 X    Leg press      #70 10 X 2 DL, #15 5X SL- with very slow eccentric control  #70 10 X 2 DL, #15 5X SL with very slow eccentric control #80 10 X 2 DL, #15 5 X SL with slow eccentric control #85 10 X 2 DL, #15 5X SL with slow eccentric control. MANUAL THERAPY: (10 minutes): Joint mobilization, Soft tissue mobilization and Manipulation was utilized and necessary because of the patient's restricted joint motion, painful spasm, loss of articular motion and restricted motion of soft tissue.   +manual stretching R ITB, Quad, hip flexor , HS        (Used abbreviations: MET - muscle energy technique; PNF - proprioceptive neuromuscular facilitation; NMR - neuromuscular re-education; AP - anterior to posterior; PA - posterior to anterior)    MODALITIES: (0 minutes):      none  Pt declined. Encouraged him to use ice at home as he needs it for inflammation and edema. TREATMENT/SESSION ASSESSMENT: Continued improvements noted proprioception and control with tap downs at R knee and dynamic SLS.  Tolerated progression of exercises well. Education: on objective findings and HEP    RECOMMENDATIONS/INTENT FOR NEXT TREATMENT SESSION: \"Next visit will focus on advancements to more challenging activities\".     PAIN: Initial: 1/10 Post Session:  0/10 (mm fatigue)     [employer] Portal    Total Treatment Billable Duration:   PT Patient Time In/Time Out  Time In: 0930  Time Out: [address]    Future Appointments   Date Time Provider [place]   12/21/2021 10:15 AM Kingston Cheng A [facility]   12/28/2021  9:30 AM Kingston Cheng A [facility]   12/30/2021  1:00 PM Kingston Cheng A [facility]   1/5/2022 10:15 AM Kingston Cheng A [facility]   1/7/2022  9:30 AM Kingston Cheng A [facility]   2/21/2022  8:45 AM [facility]   2/28/2022  8:30 AM Terra Maya PA [facility]       Visit Approval Visit # Therapist initials Date A NS / Cx < 24 hr >24 hr Cx Comments    1 ABBIE 10/12/21 [x]  [] [] Initial evaluation    2 ABBIE 11/3/21 [x] [] []     3 PDE 11-5-2021 [x] [] []     4 ABBIE 11/10/21 [x] [] []     5  11/12/21 [x] [] []     6 ABBIE 11/17/21 [x] [] []     7 PDE 11-19-21 [x] [] []     8 PDE 11-23-21 [x] [] []     9 ABBIE 12/1/21 [x] [] [] PN due next visit    10 ABBIE 12/8/21 [x] [] [] PN today    11 ABBIE 12/15/21 [x] [] [] 1    12 ABBIE 12/17/21 [x] [] [] 2       [] [] []        [] [] []        [] [] []        [] [] []        [] [] []        [] [] []

## 2021-12-21 ENCOUNTER — HOSPITAL ENCOUNTER (OUTPATIENT)
Dept: PHYSICAL THERAPY | Age: 66
Discharge: HOME | End: 2021-12-21
Attending: PHYSICIAN ASSISTANT
Payer: COMMERCIAL

## 2021-12-21 PROCEDURE — 97110 THERAPEUTIC EXERCISES: CPT

## 2021-12-21 PROCEDURE — 97140 MANUAL THERAPY 1/> REGIONS: CPT

## 2021-12-21 NOTE — PROGRESS NOTES
Gabriel Stanley  : 1955  Payor: Gaudencio Zavala / [place] [address] / Product Type: PPO /  [facility], [address], [place] [address] [address] [facility]  [phone]   [phone]      OUTPATIENT PHYSICAL THERAPY: Daily Treatment Note 2021  Visit Count:  13    ICD-10: Treatment Diagnosis:   M25.561 Pain in Right Knee  R26.89 Other Abnormalities of Gait  M25.661 Stiffness Right Knee      Precautions/Allergies:   Aspirin free [acetaminophen] and Lisinopril     TREATMENT PLAN:  Effective Dates: 10/12/2021 TO 2022 (90 days). Frequency/Duration: 2 times a week for 90 Days        PRE-TREATMENT SYMPTOMS/COMPLAINTS:  Pt reports some increased soreness at medial aspect of knee. MEDICATIONS REVIEWED:  2021   TREATMENT:   (In addition to Assessment/Re-Assessment sessions the following treatments were rendered)    THERAPEUTIC EXERCISE: (30 minutes):  Exercises per grid below to improve mobility, strength and balance. Required minimal visual and verbal cues to promote proper body alignment and promote proper body posture. Progressed resistance, range and complexity of movement as indicated.      Date:  11/10/21 Date:  2021 Date:  21 Date:  2021 Date:  2021 Date:  21 Date:  21 Date:  12/15/21 Date:  21 Date:  21   Activity/Exercise Parameters            bridges 15 X 2 20 reps/1 set  15 X 2 W/ 10 lb weight at waist 2 x 15   10# wt at waist  2 x 15   10# at waist  #10 at waist 15 X 2 #10 at waist 15 X 2 #10 at waist 30 X  #10 at waist 30 X #10 at waist 30 X    ASLR #2 10 X 2  2 lb ankle weight; 15 reps/1 set R LE; cues to move slower eccentrically #3 10 X 2  3#  2 x 15 R 3#  2 x 15 R #4 10 X 2 R  #4 15 X 2  #4 15 X 2 #4 15 X 2 #4 15 X 2   clamshell GTB 10 X 2  GTB 10 X 2 GTB  2 x 15 R GTB  2 x 15 R BTB 15 X 2 R  BTB 15 X 2 R BTB 15 X 2 BTB 15 X 2 BTB 15 X 2   S/l hip abduction 10 X 2 1.5 ankle weight; 15 reps/1 set R LE; cues for correct alignment at LE and slow movement #2 10 X 2 2#  2 x 15 R 3#  2 x 10 R #3 10 X 2 R  #3 15 X 2 R  #4 10 X 2 #4 15 X 2 #4 15 X 2   Step downs 6\" 10 X Off of 6 inch step; 15 reps/1 set leading with L (to increase demand at R LE) with medial patellar taping at R LE; minimal to no UE support 6\" 10 X 1 UE support   6\" 10 X - no UE support   6\" 10 X - no UE support 6\" 10 X no UE support     Step ups 4\"10 X Onto 6 inch step; 15 reps/1 set leading with R LE with medial patellar taping at R knee; minimal to no UE support 6\" 10 X no UE support          slantboard 30 seconds 3 X  30 seconds 3 X level 3  3 x 30 sec hold  3 x 30 sec hold         SLS Foam 3 X w/ head turns     Foam 2 X with head movements Foam 3 X w/ head movements Foam 3 X w/ head movements Foam 3 X w/ head movements Foam 3 X w/ head movements   Prone knee flexion  2 lb resistance; 20 reps/1 set R LE  #2 10 X 2 R 2#   2 x 15 R 3#   2 X 10 R #3 10 X 2 R  #4 15 X 2 R  #4 15 X 2  #4 15 X 2 #5 10 X 2   Wall squats         10 X  10 X 2   Leg press      #70 10 X 2 DL, #15 5X SL- with very slow eccentric control  #70 10 X 2 DL, #15 5X SL with very slow eccentric control #80 10 X 2 DL, #15 5 X SL with slow eccentric control #85 10 X 2 DL, #15 5X SL with slow eccentric control. #85 10 X 2 DL, #15 5 X SL     MANUAL THERAPY: (10 minutes): Joint mobilization, Soft tissue mobilization and Manipulation was utilized and necessary because of the patient's restricted joint motion, painful spasm, loss of articular motion and restricted motion of soft tissue.   +manual stretching R ITB, Quad, hip flexor , HS        (Used abbreviations: MET - muscle energy technique; PNF - proprioceptive neuromuscular facilitation; NMR - neuromuscular re-education; AP - anterior to posterior; PA - posterior to anterior)    MODALITIES: (0 minutes):      none  Pt declined. Encouraged him to use ice at home as he needs it for inflammation and edema. TREATMENT/SESSION ASSESSMENT: Continued improvements noted proprioception and control with tap downs at R knee and dynamic SLS. Tolerated progression of exercises well. Education: on objective findings and HEP    RECOMMENDATIONS/INTENT FOR NEXT TREATMENT SESSION: \"Next visit will focus on advancements to more challenging activities\".     PAIN: Initial: 1/10 (medial knee) Post Session:  0/10     Med[employer] Portal    Total Treatment Billable Duration:   PT Patient Time In/Time Out  Time In: 1020  Time Out: [address]    Future Appointments   Date Time Provider [place]   12/28/2021  9:30 AM Anne Miranda A [facility]   12/30/2021  1:00 PM Anne Miranda A [facility]   1/5/2022 10:15 AM Anne Miranda A [facility]   1/7/2022  9:30 AM Anne Miranda A [facility]   2/21/2022  8:45 AM [facility]   2/28/2022  8:30 AM Tiffany Maya, PA [facility]       Visit Approval Visit # Therapist initials Date A NS / Cx < 24 hr >24 hr Cx Comments    1 ABBIE 10/12/21 [x]  [] [] Initial evaluation    2 ABBIE 11/3/21 [x] [] []     3 PDE 11-5-2021 [x] [] []     4 ABBIE 11/10/21 [x] [] []     5  11/12/21 [x] [] []     6 ABBIE 11/17/21 [x] [] []     7 PDE 11-19-21 [x] [] []     8 PDE 11-23-21 [x] [] []     9 ABBIE 12/1/21 [x] [] [] PN due next visit    10 ABBIE 12/8/21 [x] [] [] PN today    11 ABBIE 12/15/21 [x] [] [] 1    12 ABBIE 12/17/21 [x] [] [] 2    13 ABBIE 12/21/21 [x] [] [] 3       [] [] []        [] [] []        [] [] []        [] [] []        [] [] []

## 2021-12-30 ENCOUNTER — HOSPITAL ENCOUNTER (OUTPATIENT)
Dept: PHYSICAL THERAPY | Age: 66
Discharge: HOME | End: 2021-12-30
Attending: PHYSICIAN ASSISTANT
Payer: COMMERCIAL

## 2021-12-30 PROCEDURE — 97110 THERAPEUTIC EXERCISES: CPT

## 2021-12-30 PROCEDURE — 97140 MANUAL THERAPY 1/> REGIONS: CPT

## 2021-12-30 NOTE — PROGRESS NOTES
Sheri Pringle  : 1955  Payor: Brooke Hankins / [place] OF [address] / Product Type: PPO /  [address]  at [facility]  [address], [address], [place] [address] [address] [facility]  [phone]   [phone]      OUTPATIENT PHYSICAL THERAPY: Daily Treatment Note 2021  Visit Count:  14    ICD-10: Treatment Diagnosis:   M25.561 Pain in Right Knee  R26.89 Other Abnormalities of Gait  M25.661 Stiffness Right Knee      Precautions/Allergies:   Aspirin free [acetaminophen] and Lisinopril     TREATMENT PLAN:  Effective Dates: 10/12/2021 TO 2022 (90 days). Frequency/Duration: 2 times a week for 90 Days        PRE-TREATMENT SYMPTOMS/COMPLAINTS:  Pt reports some more discomfort at medial aspect of knee but states he hasn't been exercising. MEDICATIONS REVIEWED:  2021   TREATMENT:   (In addition to Assessment/Re-Assessment sessions the following treatments were rendered)    THERAPEUTIC EXERCISE: (30 minutes):  Exercises per grid below to improve mobility, strength and balance. Required minimal visual and verbal cues to promote proper body alignment and promote proper body posture. Progressed resistance, range and complexity of movement as indicated.      Date:  11/10/21 Date:  2021 Date:  21 Date:  2021 Date:  2021 Date:  21 Date:  21 Date:  12/15/21 Date:  21 Date:  21 Date:  21   Activity/Exercise Parameters             bridges 15 X 2 20 reps/1 set  15 X 2 W/ 10 lb weight at waist 2 x 15   10# wt at waist  2 x 15   10# at waist  #10 at waist 15 X 2 #10 at waist 15 X 2 #10 at waist 30 X  #10 at waist 30 X #10 at waist 30 X  #10 at wait 30 X   ASLR #2 10 X 2  2 lb ankle weight; 15 reps/1 set R LE; cues to move slower eccentrically #3 10 X 2  3#  2 x 15 R 3#  2 x 15 R #4 10 X 2 R  #4 15 X 2  #4 15 X 2 #4 15 X 2 #4 15 X 2 #4 15 X 2   clamshell GTB 10 X 2  GTB 10 X 2 GTB  2 x 15 R GTB  2 x 15 R BTB 15 X 2 R  BTB 15 X 2 R BTB 15 X 2 BTB 15 X 2 BTB 15 X 2 BTB 15 X 2   S/l hip abduction 10 X 2 1.5 ankle weight; 15 reps/1 set R LE; cues for correct alignment at LE and slow movement #2 10 X 2 2#  2 x 15 R 3#  2 x 10 R #3 10 X 2 R  #3 15 X 2 R  #4 10 X 2 #4 15 X 2 #4 15 X 2 #4 15 X 2   Step downs 6\" 10 X Off of 6 inch step; 15 reps/1 set leading with L (to increase demand at R LE) with medial patellar taping at R LE; minimal to no UE support 6\" 10 X 1 UE support   6\" 10 X - no UE support   6\" 10 X - no UE support 6\" 10 X no UE support      Step ups 4\"10 X Onto 6 inch step; 15 reps/1 set leading with R LE with medial patellar taping at R knee; minimal to no UE support 6\" 10 X no UE support           slantboard 30 seconds 3 X  30 seconds 3 X level 3  3 x 30 sec hold  3 x 30 sec hold          SLS Foam 3 X w/ head turns     Foam 2 X with head movements Foam 3 X w/ head movements Foam 3 X w/ head movements Foam 3 X w/ head movements Foam 3 X w/ head movements Foam 3X w/ head movements   Prone knee flexion  2 lb resistance; 20 reps/1 set R LE  #2 10 X 2 R 2#   2 x 15 R 3#   2 X 10 R #3 10 X 2 R  #4 15 X 2 R  #4 15 X 2  #4 15 X 2 #5 10 X 2 #5 15 X 2   Wall squats         10 X  10 X 2    Leg press      #70 10 X 2 DL, #15 5X SL- with very slow eccentric control  #70 10 X 2 DL, #15 5X SL with very slow eccentric control #80 10 X 2 DL, #15 5 X SL with slow eccentric control #85 10 X 2 DL, #15 5X SL with slow eccentric control.   #85 10 X 2 DL, #15 5 X SL #85 15 X 2 DL, #15 10 X SL     MANUAL THERAPY: (10 minutes): Joint mobilization, Soft tissue mobilization and Manipulation was utilized and necessary because of the patient's restricted joint motion, painful spasm, loss of articular motion and restricted motion of soft tissue.   +manual stretching R ITB, Quad, hip flexor , HS        (Used abbreviations: MET - muscle energy technique; PNF - proprioceptive neuromuscular facilitation; NMR - neuromuscular re-education; AP - anterior to posterior; PA - posterior to anterior)    MODALITIES: (0 minutes):      none  Pt declined. Encouraged him to use ice at home as he needs it for inflammation and edema. TREATMENT/SESSION ASSESSMENT: Continued improvements noted proprioception and control with tap downs at R knee and dynamic SLS. Anticipate discharge next week as pt is reaching maximum rehab potential.     Education: on objective findings and HEP    RECOMMENDATIONS/INTENT FOR NEXT TREATMENT SESSION: \"Next visit will focus on advancements to more challenging activities\".     PAIN: Initial: 2/10 (medial knee) Post Session:  0/10     MedBridge Portal    Total Treatment Billable Duration:   PT Patient Time In/Time Out  Time In: 1300  Time Out: [address]    Future Appointments   Date Time Provider [place]   1/5/2022 10:15 AM Navya  A [facility]   1/7/2022  9:30 AM Navya  A [facility]   2/21/2022  8:45 AM [facility]   2/28/2022  8:30 AM Jarek Maya PA [facility]       Visit Approval Visit # Therapist initials Date A NS / Cx < 24 hr >24 hr Cx Comments    1 ABBIE 10/12/21 [x]  [] [] Initial evaluation    2 ABBIE 11/3/21 [x] [] []     3 PDE 11-5-2021 [x] [] []     4 ABBIE 11/10/21 [x] [] []     5  11/12/21 [x] [] []     6 ABBIE 11/17/21 [x] [] []     7 PDE 11-19-21 [x] [] []     8 PDE 11-23-21 [x] [] []     9 ABBIE 12/1/21 [x] [] [] PN due next visit    10 ABBIE 12/8/21 [x] [] [] PN today    11 ABBIE 12/15/21 [x] [] [] 1    12 ABBIE 12/17/21 [x] [] [] 2    13 ABBIE 12/21/21 [x] [] [] 3     ABBIE 12/28/21  [x] []     14 ABBIE 12/30/21 [x] [] [] 4       [] [] []        [] [] []        [] [] []

## 2022-01-05 ENCOUNTER — HOSPITAL ENCOUNTER (OUTPATIENT)
Dept: PHYSICAL THERAPY | Age: 67
Discharge: HOME | End: 2022-01-05
Attending: PHYSICIAN ASSISTANT
Payer: COMMERCIAL

## 2022-01-05 PROCEDURE — 97140 MANUAL THERAPY 1/> REGIONS: CPT

## 2022-01-05 PROCEDURE — 97110 THERAPEUTIC EXERCISES: CPT

## 2022-01-05 NOTE — PROGRESS NOTES
Melvin Samayoa  : 1955  Payor: Jefry Mota / [place] OF [address] / Product Type: PPO /  [facility]rapy Center at [facility]  [address] [facility], [place] [address] [address] [facility]  [phone]   [phone]      OUTPATIENT PHYSICAL THERAPY: Daily Treatment Note 2022  Visit Count:  15    ICD-10: Treatment Diagnosis:   M25.561 Pain in Right Knee  R26.89 Other Abnormalities of Gait  M25.661 Stiffness Right Knee      Precautions/Allergies:   Aspirin free [acetaminophen] and Lisinopril     TREATMENT PLAN:  Effective Dates: 10/12/2021 TO 2022 (90 days). Frequency/Duration: 2 times a week for 90 Days        PRE-TREATMENT SYMPTOMS/COMPLAINTS:  Pt reports having some pain in the night but no pain currently  MEDICATIONS REVIEWED:  2022   TREATMENT:   (In addition to Assessment/Re-Assessment sessions the following treatments were rendered)    THERAPEUTIC EXERCISE: (30 minutes):  Exercises per grid below to improve mobility, strength and balance. Required minimal visual and verbal cues to promote proper body alignment and promote proper body posture. Progressed resistance, range and complexity of movement as indicated.      Date:  11/10/21 Date:  2021 Date:  21 Date:  2021 Date:  2021 Date:  21 Date:  21 Date:  12/15/21 Date:  21 Date:  21 Date:  21 Date:   22   Activity/Exercise Parameters              bridges 15 X 2 20 reps/1 set  15 X 2 W/ 10 lb weight at waist 2 x 15   10# wt at waist  2 x 15   10# at waist  #10 at waist 15 X 2 #10 at waist 15 X 2 #10 at waist 30 X  #10 at waist 30 X #10 at waist 30 X  #10 at wait 30 X #10 at waist 30 X    ASLR #2 10 X 2  2 lb ankle weight; 15 reps/1 set R LE; cues to move slower eccentrically #3 10 X 2  3#  2 x 15 R 3#  2 x 15 R #4 10 X 2 R  #4 15 X 2  #4 15 X 2 #4 15 X 2 #4 15 X 2 #4 15 X 2 #5 10 X 2   clamshell GTB 10 X 2  GTB 10 X 2 GTB  2 x 15 R GTB  2 x 15 R BTB 15 X 2 R  BTB 15 X 2 R BTB 15 X 2 BTB 15 X 2 BTB 15 X 2 BTB 15 X 2 BTB 15 X 2   S/l hip abduction 10 X 2 1.5 ankle weight; 15 reps/1 set R LE; cues for correct alignment at LE and slow movement #2 10 X 2 2#  2 x 15 R 3#  2 x 10 R #3 10 X 2 R  #3 15 X 2 R  #4 10 X 2 #4 15 X 2 #4 15 X 2 #4 15 X 2 #5 10 X 2   Step downs 6\" 10 X Off of 6 inch step; 15 reps/1 set leading with L (to increase demand at R LE) with medial patellar taping at R LE; minimal to no UE support 6\" 10 X 1 UE support   6\" 10 X - no UE support   6\" 10 X - no UE support 6\" 10 X no UE support       Step ups 4\"10 X Onto 6 inch step; 15 reps/1 set leading with R LE with medial patellar taping at R knee; minimal to no UE support 6\" 10 X no UE support            slantboard 30 seconds 3 X  30 seconds 3 X level 3  3 x 30 sec hold  3 x 30 sec hold           SLS Foam 3 X w/ head turns     Foam 2 X with head movements Foam 3 X w/ head movements Foam 3 X w/ head movements Foam 3 X w/ head movements Foam 3 X w/ head movements Foam 3X w/ head movements Foam 3X w/ head movements   Prone knee flexion  2 lb resistance; 20 reps/1 set R LE  #2 10 X 2 R 2#   2 x 15 R 3#   2 X 10 R #3 10 X 2 R  #4 15 X 2 R  #4 15 X 2  #4 15 X 2 #5 10 X 2 #5 15 X 2 #5 15 X 2   Wall squats         10 X  10 X 2     Leg press      #70 10 X 2 DL, #15 5X SL- with very slow eccentric control  #70 10 X 2 DL, #15 5X SL with very slow eccentric control #80 10 X 2 DL, #15 5 X SL with slow eccentric control #85 10 X 2 DL, #15 5X SL with slow eccentric control.   #85 10 X 2 DL, #15 5 X SL #85 15 X 2 DL, #15 10 X SL #85 15 X 2 DL, #15 10 X  SL     MANUAL THERAPY: (10 minutes): Joint mobilization, Soft tissue mobilization and Manipulation was utilized and necessary because of the patient's restricted joint motion, painful spasm, loss of articular motion and restricted motion of soft tissue.   +manual stretching R ITB, Quad, hip flexor , HS        (Used abbreviations: MET - muscle energy technique; PNF - proprioceptive neuromuscular facilitation; NMR - neuromuscular re-education; AP - anterior to posterior; PA - posterior to anterior)    MODALITIES: (0 minutes):      none  Pt declined. Encouraged him to use ice at home as he needs it for inflammation and edema. TREATMENT/SESSION ASSESSMENT: Continued improvements noted proprioception and control with tap downs at R knee and dynamic SLS. Anticipate discharge next visit as pt is reaching maximum rehab potential.     Education: on objective findings and HEP    RECOMMENDATIONS/INTENT FOR NEXT TREATMENT SESSION: \"Next visit will focus on advancements to more challenging activities\".     PAIN: Initial: 0/10 (medial knee) Post Session:  0/10     MedBridge Portal    Total Treatment Billable Duration:   PT Patient Time In/Time Out  Time In: 1015  Time Out: [address]    Future Appointments   Date Time Provider Jasper Olivera   1/7/2022  9:30 AM Katie MENDIOLA [facility]   2/21/2022  8:45 AM PV[facility]   2/28/2022  8:30 AM Alfred Maya PA [facility]       Visit Approval Visit # Therapist initials Date A NS / Cx < 24 hr >24 hr Cx Comments    1 ABBIE 10/12/21 [x]  [] [] Initial evaluation    2 ABBIE 11/3/21 [x] [] []     3 PDE 11-5-2021 [x] [] []     4 ABBIE 11/10/21 [x] [] []     5  11/12/21 [x] [] []     6 ABBIE 11/17/21 [x] [] []     7 PDE 11-19-21 [x] [] []     8 PDE 11-23-21 [x] [] []     9 ABBIE 12/1/21 [x] [] [] PN due next visit    10 ABBIE 12/8/21 [x] [] [] PN today    11 ABBIE 12/15/21 [x] [] [] 1    12 ABBIE 12/17/21 [x] [] [] 2    13 ABBIE 12/21/21 [x] [] [] 3     ABBIE 12/28/21  [x] []     14 ABBIE 12/30/21 [x] [] [] 4    15 ABBIE 1/5/22 [x] [] [] 5- discharge next visit       [] [] []        [] [] []

## 2022-01-07 ENCOUNTER — HOSPITAL ENCOUNTER (OUTPATIENT)
Dept: PHYSICAL THERAPY | Age: 67
Discharge: HOME | End: 2022-01-07
Attending: PHYSICIAN ASSISTANT
Payer: COMMERCIAL

## 2022-01-07 PROCEDURE — 97140 MANUAL THERAPY 1/> REGIONS: CPT

## 2022-01-07 PROCEDURE — 97110 THERAPEUTIC EXERCISES: CPT

## 2022-01-07 NOTE — PROGRESS NOTES
Merlinda Mola  : 1955  Payor: Brett Stallings / [place] [address] / Product Type: PPO /  [address]  at [facility]  [address], [address] [facility], [address]  [phone]   [phone]      OUTPATIENT PHYSICAL THERAPY: Daily Treatment Note 2022  Visit Count:  16    ICD-10: Treatment Diagnosis:   M25.561 Pain in Right Knee  R26.89 Other Abnormalities of Gait  M25.661 Stiffness Right Knee      Precautions/Allergies:   Aspirin free [acetaminophen] and Lisinopril     TREATMENT PLAN:  Effective Dates: 10/12/2021 TO 2022 (90 days). Frequency/Duration: 2 times a week for 90 Days        PRE-TREATMENT SYMPTOMS/COMPLAINTS:  Pt reports he is much improved at knee with PT.  MEDICATIONS REVIEWED:  2022   TREATMENT:   (In addition to Assessment/Re-Assessment sessions the following treatments were rendered)    THERAPEUTIC EXERCISE: (30 minutes):  Exercises per grid below to improve mobility, strength and balance. Required minimal visual and verbal cues to promote proper body alignment and promote proper body posture. Progressed resistance, range and complexity of movement as indicated.      Date:  11/10/21 Date:  2021 Date:  21 Date:  2021 Date:  2021 Date:  21 Date:  21 Date:  12/15/21 Date:  21 Date:  21 Date:  21 Date:   22 Date:  22   Activity/Exercise Parameters               bridges 15 X 2 20 reps/1 set  15 X 2 W/ 10 lb weight at waist 2 x 15   10# wt at waist  2 x 15   10# at waist  #10 at waist 15 X 2 #10 at waist 15 X 2 #10 at waist 30 X  #10 at waist 30 X #10 at waist 30 X  #10 at wait 30 X #10 at waist 30 X  #10 at waist 30 X   ASLR #2 10 X 2  2 lb ankle weight; 15 reps/1 set R LE; cues to move slower eccentrically #3 10 X 2  3#  2 x 15 R 3#  2 x 15 R #4 10 X 2 R  #4 15 X 2  #4 15 X 2 #4 15 X 2 #4 15 X 2 #4 15 X 2 #5 10 X 2 #5 10 X 2   clamshell GTB 10 X 2  GTB 10 X 2 GTB  2 x 15 R GTB  2 x 15 R BTB 15 X 2 R  BTB 15 X 2 R BTB 15 X 2 BTB 15 X 2 BTB 15 X 2 BTB 15 X 2 BTB 15 X 2 BTB 15 X 2   S/l hip abduction 10 X 2 1.5 ankle weight; 15 reps/1 set R LE; cues for correct alignment at LE and slow movement #2 10 X 2 2#  2 x 15 R 3#  2 x 10 R #3 10 X 2 R  #3 15 X 2 R  #4 10 X 2 #4 15 X 2 #4 15 X 2 #4 15 X 2 #5 10 X 2 #5 10 X 2   Step downs 6\" 10 X Off of 6 inch step; 15 reps/1 set leading with L (to increase demand at R LE) with medial patellar taping at R LE; minimal to no UE support 6\" 10 X 1 UE support   6\" 10 X - no UE support   6\" 10 X - no UE support 6\" 10 X no UE support        Step ups 4\"10 X Onto 6 inch step; 15 reps/1 set leading with R LE with medial patellar taping at R knee; minimal to no UE support 6\" 10 X no UE support             slantboard 30 seconds 3 X  30 seconds 3 X level 3  3 x 30 sec hold  3 x 30 sec hold            SLS Foam 3 X w/ head turns     Foam 2 X with head movements Foam 3 X w/ head movements Foam 3 X w/ head movements Foam 3 X w/ head movements Foam 3 X w/ head movements Foam 3X w/ head movements Foam 3X w/ head movements Foam 3 X w/ head movements    Prone knee flexion  2 lb resistance; 20 reps/1 set R LE  #2 10 X 2 R 2#   2 x 15 R 3#   2 X 10 R #3 10 X 2 R  #4 15 X 2 R  #4 15 X 2  #4 15 X 2 #5 10 X 2 #5 15 X 2 #5 15 X 2 #5 15 X 2   Wall squats         10 X  10 X 2      Leg press      #70 10 X 2 DL, #15 5X SL- with very slow eccentric control  #70 10 X 2 DL, #15 5X SL with very slow eccentric control #80 10 X 2 DL, #15 5 X SL with slow eccentric control #85 10 X 2 DL, #15 5X SL with slow eccentric control. #85 10 X 2 DL, #15 5 X SL #85 15 X 2 DL, #15 10 X SL #85 15 X 2 DL, #15 10 X  SL #85 15 X 2 DL, #15 10 X SL     MANUAL THERAPY: (10 minutes): Joint mobilization, Soft tissue mobilization and Manipulation was utilized and necessary because of the patient's restricted joint motion, painful spasm, loss of articular motion and restricted motion of soft tissue. +manual stretching R ITB, Quad, hip flexor , HS        (Used abbreviations: MET - muscle energy technique; PNF - proprioceptive neuromuscular facilitation; NMR - neuromuscular re-education; AP - anterior to posterior; PA - posterior to anterior)      TREATMENT/SESSION ASSESSMENT: Discharge today with HEP. Pt has met maximum rehab potential at this time.         PAIN: Initial: 0/10 (medial knee) Post Session:  0/10     MedBridge Portal    Total Treatment Billable Duration:   PT Patient Time In/Time Out  Time In: 0930  Time Out: [address]    Future Appointments   Date Time Provider Jasper Joselin   2/21/2022  8:45 AM [facility]   2/28/2022  8:30 AM Dorota Maya PA [facility]       Visit Approval Visit # Therapist initials Date A NS / Cx < 24 hr >24 hr Cx Comments    1 ABBIE 10/12/21 [x]  [] [] Initial evaluation    2 ABBIE 11/3/21 [x] [] []     3 PDE 11-5-2021 [x] [] []     4 ABBIE 11/10/21 [x] [] []     5  11/12/21 [x] [] []     6 ABBIE 11/17/21 [x] [] []     7 PDE 11-19-21 [x] [] []     8 PDE 11-23-21 [x] [] []     9 ABBIE 12/1/21 [x] [] [] PN due next visit    10 ABBIE 12/8/21 [x] [] [] PN today    11 ABBIE 12/15/21 [x] [] [] 1    12 ABBIE 12/17/21 [x] [] [] 2    13 ABBIE 12/21/21 [x] [] [] 3     ABBIE 12/28/21  [x] []     14 ABBIE 12/30/21 [x] [] [] 4    15 ABBIE 1/5/22 [x] [] [] 5- discharge next visit    16 [facility] 1/7/22 [x] [] [] Discharge today       [] [] []

## 2022-01-07 NOTE — THERAPY DISCHARGE
Lili Mulligan  : 1955  Payor: Sanjana Alexis / [place] [address] / Product Type: PPO /  [address]  at [facility] [facility], [place] [address] [address] [facility]  [phone]   [phone]        OUTPATIENT PHYSICAL THERAPY:Discharge 2022    ICD-10: Treatment Diagnosis:   M25.561 Pain in Right Knee  R26.89 Other Abnormalities of Gait  M25.661 Stiffness Right Knee    Precautions/Allergies:   Aspirin free [acetaminophen] and Lisinopril   Fall Risk Score: 2  (? 5 = High Risk)  MD Orders: Eval and Treat MEDICAL/REFERRING DIAGNOSIS:  Unilateral primary osteoarthritis, right knee [M17.11]  Pain in right knee [M25.561]  Effusion, right knee [M25.461]   DATE OF ONSET: 6-8 months  REFERRING PHYSICIAN: Miri Maya*  RETURN PHYSICIAN APPOINTMENT: TBD by pt   Discharge Summary: Pt has been seen for a total of 16 PT sessions to date. Pt has demonstrated overall improvements in strength, balance, and pain. Pt has met maximum rehab potential at this time and is independent with HEP. Will discharge case at this time. Progress Report: Pt has been seen for a total of 10 PT sessions to date. Pt demonstrating improvements in strength, balance and pain. Pt will continue to benefit from skilled PT interventions with focus on knee proprioception with balance and eccentric quad strength. ASSESSMENT:  Mr. Demetra Bragg has attended 1 physical therapy session including the initial evaluation. Pt presents with c/o R knee pain secondary to OA. Pt presents with decreased ROM, decreased strength, and associated increased pain affecting participation in functional mobility and recreational activities. Recommending skilled PT: manual therapeutic techniques (as appropriate), therapeutic exercises and activities, balance interventions, and a comprehensive home exercise program to address the current impairments, as listed above.   Lili Mulligan will benefit from skilled PT (medically necessary) to address above deficits affecting participation in basic ADLs and overall functional tolerance. PROBLEM LIST (Impacting functional limitations)  1. Decreased Strength  2. Decreased ADL/Functional Activities  3. Decreased Transfer Abilities  4. Decreased Ambulation Ability/Technique  5. Decreased Balance  6. Increased Pain  7. Decreased Flexibility/Joint Mobility  8. Decreased [place] with Home Exercise Program INTERVENTIONS PLANNED:  1. Balance Exercise  2. Cold  3. Cryotherapy  4. Electrical Stimulation  5. Family Education  6. Gait Training  7. Heat  8. Home Exercise Program (HEP)  9. Manual Therapy  10. Neuromuscular Re-education/Strengthening  11. Range of Motion (ROM)  12. Therapeutic Activites  13. Therapeutic Exercise/Strengthening  14. Transcutaneous Electrical Nerve Stimulation (TENS)  15. Transfer Training  16. Vasopneumatic Device  17. Dry Needling   TREATMENT PLAN:  TREATMENT PLAN:  Effective Dates: 10/12/2021 TO 1/9/2022 (90 days). Frequency/Duration: 2 times a week for 90 Days  GOALS: (Goals have been discussed and agreed upon with patient.)  Discharge Goals: Time Frame: 12 weeks  1. Shlomo Albarado will be independent with HEP. Goal met 12/8/21  2. Shlomo Albarado will report 2/10 pain in order to ascend/descend steps without difficulty Not met 1/7/21 (3/10)  3. Shlomo Albarado will demonstrate 0-140 degrees knee ROM in order to return to squatting without difficulty Goal met 1/7/22  4. Shlomo Albarado will demonstrate 5/5 LE strength in order to return to hiking Goal met 12/8/21  1. Shlomo Albarado will score 65/80 on LEFS demonstrating overall improvements in functional mobility Goal met 1/7/22  Rehabilitation Potential For Stated Goals: Good    Outcome Measure:    Tool Used: Lower Extremity Functional Scale (LEFS)  Score:  Initial: 51/80 Most Recent: 52/80 (Date: 12/8/21 ) Most Recent: 65/80 (Date: 1/7/22)   Interpretation of Score: 20 questions each scored on a 5 point scale with 0 representing \"extreme difficulty or unable to perform\" and 4 representing \"no difficulty\". The lower the score, the greater the functional disability. 80/80 represents no disability. Minimal detectable change is 9 points. Regarding Trey Hart's therapy, I certify that the treatment plan above will be carried out by a therapist or under their direction. Thank you for this referral,  [facility]     Referring Physician Signature: Arley Esqueda*            The information in this section was collected on 10/12/21 (except where otherwise noted). HISTORY:   History of Present Injury/Illness (Reason for Referral):  Pt arrives today with c/o R knee pain. Pt reports no specific MINH but that pain just progressively worsened over time. He states approximately 3 months ago he began exercises and found some relief with it but still does continue to have pain. It did not resolve completely Pt reports pain is intermittent. Pt reports having increased stiffness (difficulty with both extension and flexion). Reports most pain is medially and does have popping. Pt reports aggravating factors include any physical activity. CC/Primary Concern: R knee pain and stiffness            Treatment Side: Right    Past Medical History/Comorbidities:   Mr. Paulo Albarado  has a past medical history of Essential hypertension, benign, Mild intermittent asthma, and Sleep disturbance. Mr. Paulo Albarado  has no past surgical history on file.   Social History/Living Environment:   Lives with wife, 1 story, no steps    Pain/Symptom Location: medial pain and overall joint pain    Worst Pain: 6/10  Current Pain: 0/10    Aggravating Factors: Standing, Walking, Stairs Up, Stairs Down and Sit to stand, squatting    Alleviating Factors/Positions/Motions: Exercising, middle bend (resting position)      Diagnostic Imaging: Xray: showing OA and fluid    Occupation: Working- practice law      Prior Level of Function/Work/Activity:  History of knee pain but nothing that lasted, Independent and relatively painfree    Patient Goals:   Get back normal if possible- as much improvement as possible, return to hiking. Current Medications:       Current Outpatient Medications:     DULoxetine (CYMBALTA) 60 mg capsule, TAKE 1 CAPSULE BY MOUTH EVERY DAY, Disp: 90 Capsule, Rfl: 3    Armodafinil (NuvigiL) 150 mg tab, 1 po QAM --For Excessive Daytime Somnolence, Disp: 90 Tablet, Rfl: 1    metoprolol succinate (TOPROL-XL) 50 mg XL tablet, Take 1 Tablet by mouth daily. , Disp: 90 Tablet, Rfl: 0    rosuvastatin (CRESTOR) 5 mg tablet, TAKE 1 TABLET BY MOUTH EVERY DAY AT NIGHT, Disp: 90 Tab, Rfl: 3    olmesartan (BENICAR) 40 mg tablet, Take 1 Tab by mouth daily. , Disp: 90 Tab, Rfl: 3    albuterol (PROVENTIL HFA, VENTOLIN HFA, PROAIR HFA) 90 mcg/actuation inhaler, INHALE 2 PUFFS BY MOUTH EVERY 4 HOURS AS NEEDED FOR WHEEZE, Disp: 1 Inhaler, Rfl: 1    topiramate (TOPAMAX) 50 mg tablet, Take 1 Tab by mouth daily. Indications: migraine prevention, Disp: 90 Tab, Rfl: 3    tamsulosin (FLOMAX) 0.4 mg capsule, TAKE 1 CAPSULE BY MOUTH EVERY DAY  Indications: enlarged prostate with urination problem, Disp: 90 Cap, Rfl: 3   Date Last Reviewed:  1/7/2022       Ambulatory/Rehab Services H2 Model Falls Risk Assessment    Risk Factors:       (1)  Gender [Male] Ability to Rise from Chair:       (1)  Pushes up, successful in one attempt    Falls Prevention Plan:       No modifications necessary   Total: (5 or greater = High Risk): 2    ©2010 [facility] of [place] [address] [place] [address] States Patent #[id number].  Federal Law prohibits the replication, distribution or use without written permission from [facility] [employer]        Number of Personal Factors/Comorbidities that affect the Plan of Care: 1-2: MODERATE COMPLEXITY   EXAMINATION: ________________________________________________________________________________________________  Observation        Gait: Decreased Gait Speed and Decreased Stride Length        Assistive Device: None              Edema: 41.5 cm L, 42.0 cm on R         ________________________________________________________________________________________________  Range of Motion            Lower  Joint: Active Active Active Right 12/8/21 Active Right 1/7/22    Right (Degrees) Left (Degrees)     [facility] [place]     Hip Extension [facility] [place]     Hip Adduction       Hip Abduction [place] [place]     Hip Internal Rotation [place] [place]     Hip External Rotation [place]     Knee Flexion 125 degrees 145 degrees 132 degrees 140 degrees   Knee Extension -5 degrees 0 degrees -5 degrees 0 degrees           Additional Comments:   ________________________________________________________________________________________________  Strength                 Lower Extremity  Joint:   Right 12/8/21    RIGHT LEFT    Hip Flexion 4+/5 5/5 5/5    Hip Extension 4+/5 5/5 5/5   Hip Internal Rotation 5/5 5/5 5/5   Hip External Rotation 4+/5 5/5 5/5   Hip Abduction 4+/5 5/5 5/5   Hip Adduction NT/5 NT/5    Knee Flexion 5/5 5/5 5/5   Knee Extension 5/5 5/5 5/5        Additional Comments:   ________________________________________________________________________________________________  [facility]        C/O Radicular Symptoms: No            Additional Comments:   ________________________________________________________________________________________________  Special Tests      [place]: Negative        Additional Comments:     ________________________________________________________________________________________________  Palpation: No TTP this session  Joint mobilization: Hypomobility noted- R patella all directions    BALANCE Date: 10/12/2021 Date: 12/8/21 Date:  1/7/22   Right 30 seconds  Dynamic  Balance- up to 5 seconds with visual involement Dynamic balance up to 10 seconds with visual involvement   Left               Body Structures Involved:    9. Nerves  10. Bones  11. Joints  12. Muscles  13. Ligaments Body Functions Affected:  18. Sensory/Pain  19. Neuromusculoskeletal  20. Movement Related Activities and Participation Affected:  1. General Tasks and Demands  2. Mobility  3. Self Care  4. Domestic Life  5. Interpersonal Interactions and Relationships  6.  Community, Social and [place]   Number of elements (examined above) that affect the Plan of Care: 4+: HIGH COMPLEXITY   CLINICAL PRESENTATION:   Presentation: Evolving clinical presentation with changing clinical characteristics: MODERATE COMPLEXITY   CLINICAL DECISION MAKING:      Use of outcome tool(s) and clinical judgement create a POC that gives a: Questionable prediction of patient's progress: MODERATE COMPLEXITY

## 2022-03-18 PROBLEM — M17.11 ARTHRITIS OF RIGHT KNEE: Status: ACTIVE | Noted: 2021-09-07

## 2022-03-18 PROBLEM — J30.1 SEASONAL ALLERGIC RHINITIS DUE TO POLLEN: Status: ACTIVE | Noted: 2017-02-18

## 2022-03-19 PROBLEM — E66.09 CLASS 1 OBESITY DUE TO EXCESS CALORIES WITH SERIOUS COMORBIDITY AND BODY MASS INDEX (BMI) OF 31.0 TO 31.9 IN ADULT: Status: ACTIVE | Noted: 2021-02-23

## 2022-03-19 PROBLEM — E66.811 CLASS 1 OBESITY DUE TO EXCESS CALORIES WITH SERIOUS COMORBIDITY AND BODY MASS INDEX (BMI) OF 31.0 TO 31.9 IN ADULT: Status: ACTIVE | Noted: 2021-02-23

## 2022-03-19 PROBLEM — G43.109 MIGRAINE WITH AURA AND WITHOUT STATUS MIGRAINOSUS, NOT INTRACTABLE: Status: ACTIVE | Noted: 2019-04-18

## 2022-03-19 PROBLEM — G47.30 SLEEP APNEA IN ADULT: Status: ACTIVE | Noted: 2020-09-17

## 2022-03-19 PROBLEM — R40.0 UNCONTROLLED DAYTIME SOMNOLENCE: Status: ACTIVE | Noted: 2020-12-30

## 2022-03-20 PROBLEM — N40.1 BPH WITH OBSTRUCTION/LOWER URINARY TRACT SYMPTOMS: Status: ACTIVE | Noted: 2019-08-13

## 2022-03-20 PROBLEM — N13.8 BPH WITH OBSTRUCTION/LOWER URINARY TRACT SYMPTOMS: Status: ACTIVE | Noted: 2019-08-13

## 2022-07-23 DIAGNOSIS — F32.1 MAJOR DEPRESSIVE DISORDER, SINGLE EPISODE, MODERATE (HCC): ICD-10-CM

## 2022-07-25 RX ORDER — DULOXETIN HYDROCHLORIDE 30 MG/1
CAPSULE, DELAYED RELEASE ORAL
Qty: 90 CAPSULE | Refills: 3 | Status: SHIPPED | OUTPATIENT
Start: 2022-07-25

## 2022-07-29 DIAGNOSIS — I10 ESSENTIAL HYPERTENSION, BENIGN: Primary | ICD-10-CM

## 2022-07-29 DIAGNOSIS — E78.5 DYSLIPIDEMIA: ICD-10-CM

## 2022-08-08 ENCOUNTER — NURSE ONLY (OUTPATIENT)
Dept: FAMILY MEDICINE CLINIC | Facility: CLINIC | Age: 67
End: 2022-08-08

## 2022-08-08 DIAGNOSIS — E78.5 DYSLIPIDEMIA: ICD-10-CM

## 2022-08-08 DIAGNOSIS — I10 ESSENTIAL HYPERTENSION, BENIGN: ICD-10-CM

## 2022-08-08 LAB
ALBUMIN SERPL-MCNC: 4.1 G/DL (ref 3.2–4.6)
ALBUMIN/GLOB SERPL: 1.2 {RATIO} (ref 1.2–3.5)
ALP SERPL-CCNC: 83 U/L (ref 50–136)
ALT SERPL-CCNC: 64 U/L (ref 12–65)
ANION GAP SERPL CALC-SCNC: 4 MMOL/L (ref 7–16)
AST SERPL-CCNC: 39 U/L (ref 15–37)
BILIRUB SERPL-MCNC: 0.5 MG/DL (ref 0.2–1.1)
BUN SERPL-MCNC: 14 MG/DL (ref 8–23)
CALCIUM SERPL-MCNC: 8.7 MG/DL (ref 8.3–10.4)
CHLORIDE SERPL-SCNC: 107 MMOL/L (ref 98–107)
CHOLEST SERPL-MCNC: 136 MG/DL
CO2 SERPL-SCNC: 25 MMOL/L (ref 21–32)
CREAT SERPL-MCNC: 1 MG/DL (ref 0.8–1.5)
GLOBULIN SER CALC-MCNC: 3.3 G/DL (ref 2.3–3.5)
GLUCOSE SERPL-MCNC: 152 MG/DL (ref 65–100)
HDLC SERPL-MCNC: 53 MG/DL (ref 40–60)
HDLC SERPL: 2.6 {RATIO}
LDLC SERPL CALC-MCNC: 40 MG/DL
POTASSIUM SERPL-SCNC: 4 MMOL/L (ref 3.5–5.1)
PROT SERPL-MCNC: 7.4 G/DL (ref 6.3–8.2)
SODIUM SERPL-SCNC: 136 MMOL/L (ref 138–145)
TRIGL SERPL-MCNC: 215 MG/DL (ref 35–150)
VLDLC SERPL CALC-MCNC: 43 MG/DL (ref 6–23)

## 2022-08-15 ENCOUNTER — OFFICE VISIT (OUTPATIENT)
Dept: FAMILY MEDICINE CLINIC | Facility: CLINIC | Age: 67
End: 2022-08-15
Payer: COMMERCIAL

## 2022-08-15 VITALS
OXYGEN SATURATION: 98 % | HEIGHT: 71 IN | BODY MASS INDEX: 30.1 KG/M2 | DIASTOLIC BLOOD PRESSURE: 68 MMHG | TEMPERATURE: 97.8 F | WEIGHT: 215 LBS | HEART RATE: 106 BPM | RESPIRATION RATE: 15 BRPM | SYSTOLIC BLOOD PRESSURE: 122 MMHG

## 2022-08-15 DIAGNOSIS — I10 ESSENTIAL HYPERTENSION, BENIGN: Primary | ICD-10-CM

## 2022-08-15 DIAGNOSIS — G43.109 MIGRAINE WITH AURA AND WITHOUT STATUS MIGRAINOSUS, NOT INTRACTABLE: ICD-10-CM

## 2022-08-15 DIAGNOSIS — N40.1 BPH WITH OBSTRUCTION/LOWER URINARY TRACT SYMPTOMS: ICD-10-CM

## 2022-08-15 DIAGNOSIS — F10.20 ALCOHOL DEPENDENCE, DAILY USE (HCC): ICD-10-CM

## 2022-08-15 DIAGNOSIS — E78.5 DYSLIPIDEMIA: ICD-10-CM

## 2022-08-15 DIAGNOSIS — Z12.12 SCREENING FOR COLORECTAL CANCER: ICD-10-CM

## 2022-08-15 DIAGNOSIS — N13.8 BPH WITH OBSTRUCTION/LOWER URINARY TRACT SYMPTOMS: ICD-10-CM

## 2022-08-15 DIAGNOSIS — R73.9 HYPERGLYCEMIA: ICD-10-CM

## 2022-08-15 DIAGNOSIS — F32.1 MODERATE MAJOR DEPRESSION (HCC): ICD-10-CM

## 2022-08-15 DIAGNOSIS — Z12.11 SCREENING FOR COLORECTAL CANCER: ICD-10-CM

## 2022-08-15 DIAGNOSIS — G47.9 SLEEP DISTURBANCE: ICD-10-CM

## 2022-08-15 DIAGNOSIS — G47.30 SLEEP APNEA IN ADULT: ICD-10-CM

## 2022-08-15 DIAGNOSIS — J45.20 MILD INTERMITTENT ASTHMA WITHOUT COMPLICATION: ICD-10-CM

## 2022-08-15 PROCEDURE — 99214 OFFICE O/P EST MOD 30 MIN: CPT | Performed by: PHYSICIAN ASSISTANT

## 2022-08-15 RX ORDER — TOPIRAMATE 50 MG/1
25 TABLET, FILM COATED ORAL DAILY PRN
Qty: 90 TABLET | Refills: 3
Start: 2022-08-15

## 2022-08-15 RX ORDER — OLMESARTAN MEDOXOMIL 40 MG/1
40 TABLET ORAL DAILY
Qty: 90 TABLET | Refills: 3 | Status: SHIPPED | OUTPATIENT
Start: 2022-08-15

## 2022-08-15 RX ORDER — TAMSULOSIN HYDROCHLORIDE 0.4 MG/1
0.4 CAPSULE ORAL DAILY
Qty: 90 CAPSULE | Refills: 3 | Status: SHIPPED | OUTPATIENT
Start: 2022-08-15

## 2022-08-15 RX ORDER — ZOSTER VACCINE RECOMBINANT, ADJUVANTED 50 MCG/0.5
0.5 KIT INTRAMUSCULAR SEE ADMIN INSTRUCTIONS
Qty: 0.5 ML | Refills: 0 | Status: SHIPPED | OUTPATIENT
Start: 2022-08-15 | End: 2023-02-11

## 2022-08-15 RX ORDER — ROSUVASTATIN CALCIUM 10 MG/1
10 TABLET, COATED ORAL DAILY
Qty: 90 TABLET | Refills: 3 | Status: SHIPPED | OUTPATIENT
Start: 2022-08-15

## 2022-08-15 RX ORDER — ARMODAFINIL 150 MG/1
150 TABLET ORAL DAILY
Qty: 30 TABLET | Refills: 5 | Status: SHIPPED | OUTPATIENT
Start: 2022-08-15 | End: 2022-09-14

## 2022-08-15 RX ORDER — DULOXETIN HYDROCHLORIDE 60 MG/1
60 CAPSULE, DELAYED RELEASE ORAL DAILY
Qty: 90 CAPSULE | Refills: 3 | Status: SHIPPED | OUTPATIENT
Start: 2022-08-15

## 2022-08-15 RX ORDER — ALBUTEROL SULFATE 90 UG/1
2 AEROSOL, METERED RESPIRATORY (INHALATION) EVERY 6 HOURS PRN
Qty: 18 G | Refills: 1 | Status: SHIPPED | OUTPATIENT
Start: 2022-08-15

## 2022-08-15 ASSESSMENT — PATIENT HEALTH QUESTIONNAIRE - PHQ9
4. FEELING TIRED OR HAVING LITTLE ENERGY: 2
SUM OF ALL RESPONSES TO PHQ QUESTIONS 1-9: 15
8. MOVING OR SPEAKING SO SLOWLY THAT OTHER PEOPLE COULD HAVE NOTICED. OR THE OPPOSITE, BEING SO FIGETY OR RESTLESS THAT YOU HAVE BEEN MOVING AROUND A LOT MORE THAN USUAL: 0
9. THOUGHTS THAT YOU WOULD BE BETTER OFF DEAD, OR OF HURTING YOURSELF: 0
7. TROUBLE CONCENTRATING ON THINGS, SUCH AS READING THE NEWSPAPER OR WATCHING TELEVISION: 2
2. FEELING DOWN, DEPRESSED OR HOPELESS: 3
6. FEELING BAD ABOUT YOURSELF - OR THAT YOU ARE A FAILURE OR HAVE LET YOURSELF OR YOUR FAMILY DOWN: 2
1. LITTLE INTEREST OR PLEASURE IN DOING THINGS: 3
SUM OF ALL RESPONSES TO PHQ9 QUESTIONS 1 & 2: 6
SUM OF ALL RESPONSES TO PHQ QUESTIONS 1-9: 15
SUM OF ALL RESPONSES TO PHQ QUESTIONS 1-9: 15
10. IF YOU CHECKED OFF ANY PROBLEMS, HOW DIFFICULT HAVE THESE PROBLEMS MADE IT FOR YOU TO DO YOUR WORK, TAKE CARE OF THINGS AT HOME, OR GET ALONG WITH OTHER PEOPLE: 2
3. TROUBLE FALLING OR STAYING ASLEEP: 3
5. POOR APPETITE OR OVEREATING: 0
SUM OF ALL RESPONSES TO PHQ QUESTIONS 1-9: 15

## 2022-10-19 ENCOUNTER — OFFICE VISIT (OUTPATIENT)
Dept: SLEEP MEDICINE | Age: 67
End: 2022-10-19

## 2022-10-19 VITALS
BODY MASS INDEX: 29.96 KG/M2 | DIASTOLIC BLOOD PRESSURE: 72 MMHG | TEMPERATURE: 96.6 F | OXYGEN SATURATION: 99 % | SYSTOLIC BLOOD PRESSURE: 126 MMHG | RESPIRATION RATE: 14 BRPM | WEIGHT: 214 LBS | HEART RATE: 104 BPM | HEIGHT: 71 IN

## 2022-10-19 DIAGNOSIS — E66.09 CLASS 1 OBESITY DUE TO EXCESS CALORIES WITH SERIOUS COMORBIDITY AND BODY MASS INDEX (BMI) OF 31.0 TO 31.9 IN ADULT: ICD-10-CM

## 2022-10-19 DIAGNOSIS — G47.33 OSA (OBSTRUCTIVE SLEEP APNEA): ICD-10-CM

## 2022-10-19 DIAGNOSIS — G25.81 RLS (RESTLESS LEGS SYNDROME): ICD-10-CM

## 2022-10-19 DIAGNOSIS — F10.20 ALCOHOL DEPENDENCE, DAILY USE (HCC): Primary | ICD-10-CM

## 2022-10-19 DIAGNOSIS — I10 ESSENTIAL HYPERTENSION, BENIGN: ICD-10-CM

## 2022-10-19 PROBLEM — G47.30 SLEEP APNEA IN ADULT: Status: RESOLVED | Noted: 2020-09-17 | Resolved: 2022-10-19

## 2022-10-19 PROCEDURE — 99205 OFFICE O/P NEW HI 60 MIN: CPT | Performed by: INTERNAL MEDICINE

## 2022-10-19 RX ORDER — ARMODAFINIL 150 MG/1
150 TABLET ORAL DAILY
COMMUNITY

## 2022-10-19 RX ORDER — METOPROLOL SUCCINATE 50 MG/1
TABLET, EXTENDED RELEASE ORAL
COMMUNITY
Start: 2022-10-10

## 2022-10-19 ASSESSMENT — SLEEP AND FATIGUE QUESTIONNAIRES
HOW LIKELY ARE YOU TO NOD OFF OR FALL ASLEEP IN A CAR, WHILE STOPPED FOR A FEW MINUTES IN TRAFFIC: 0
ESS TOTAL SCORE: 11
HOW LIKELY ARE YOU TO NOD OFF OR FALL ASLEEP WHILE SITTING QUIETLY AFTER LUNCH WITHOUT ALCOHOL: 3
HOW LIKELY ARE YOU TO NOD OFF OR FALL ASLEEP WHILE SITTING AND TALKING TO SOMEONE: 1
HOW LIKELY ARE YOU TO NOD OFF OR FALL ASLEEP WHILE WATCHING TV: 0
HOW LIKELY ARE YOU TO NOD OFF OR FALL ASLEEP WHILE SITTING AND READING: 2
HOW LIKELY ARE YOU TO NOD OFF OR FALL ASLEEP WHEN YOU ARE A PASSENGER IN A CAR FOR AN HOUR WITHOUT A BREAK: 2
HOW LIKELY ARE YOU TO NOD OFF OR FALL ASLEEP WHILE SITTING INACTIVE IN A PUBLIC PLACE: 0
HOW LIKELY ARE YOU TO NOD OFF OR FALL ASLEEP WHILE LYING DOWN TO REST IN THE AFTERNOON WHEN CIRCUMSTANCES PERMIT: 3

## 2022-10-19 NOTE — ASSESSMENT & PLAN NOTE
Patient has known severe obstructive sleep apnea, markedly improved symptomatically on APAP 4-20 cm H2O, he is still having breakthrough events with AHI is over 10 on several occasions on average AHI 5.6. He is using his machine avidly over 9 hours at night 99% of the time. As he had severe desaturations on home study we will check an overnight oximetry on this new setting of 9 to 16 cm H2O, change made in office today, also will try to work on alcohol restrictions to see if this helps with AHI overall control. He was on Nuvigil in the past but is doing well currently without it and this will be discontinued. We will see him back in 6 weeks.

## 2022-10-19 NOTE — PROGRESS NOTES
[address]  [address] [facility] , [place]. [address] [facility], [address] [facility]  [phone]    PATIENT ID:  Mr. Vikram Capone   1955, 61 y.o. male  His primary provider is Suresh Ruiz    CC: Mr. Tucker Lopez comes in for evaluation of his  Results      HISTORY OF PRESENT ILLNESS  Mr. Tucker Lopez is a 61 y.o.  male referred for management of obstructive sleep apnea by his primary care physician Dr. Chris Fountain . The patient has a medical history significant for overweight with BMI 29, obstructive sleep apnea, anxiety/depression, migraine headaches, allergic conjunctivitis without rhinitis, mild intermittent asthma, hypertension/hyperlipidemia, PACs. He was diagnosed with severe obstructive sleep apnea in 2020 by home sleep testing [employer] with an AHI 60 SPO2 rayray 76%, he ended up buying a CPAP  at [facility] with APAP 4-20 cm H2O and has been using this with clinical benefit. Prior to starting CPAP he was tired had difficulty focusing and was struggling with depression. He notes this is changed his life dramatically, he did have trouble initially and had persistent daytime sleepiness and underwent a trial of new hays which did help but he has run out and does not want to restart this as he finds he is managing just well with coffee. He denies any current problems with his nasal N 20 mask. He had actually lost about 15 pounds and was trying to exercise more but has had a bit of a backslide lately. He denies any known snoring when utilizing CPAP, sleeps with his wife. He denies morning headaches but has persistent nocturia, he does drink 6 beers nightly before bed and has had trouble cutting back. He has never seen a sleep physician previously. He has occasional irregular heartbeats in the form of PACs that he notices and does get morning headaches upon awakening but this has been better controlled.   He also has a history of migraines for which he is on topiramate intractable    Dyslipidemia    BPH with obstruction/lower urinary tract symptoms    Moderate major depression (HCC)    RLS (restless legs syndrome)    DEMI (obstructive sleep apnea)       History reviewed. No pertinent surgical history. Social History     Socioeconomic History    Marital status:      Spouse name: Not on file    Number of children: Not on file    Years of education: Not on file    Highest education level: Not on file   Occupational History    Not on file   Tobacco Use    Smoking status: Never    Smokeless tobacco: Never   Substance and Sexual Activity    Alcohol use: Yes     Alcohol/week: 0.0 standard drinks    Drug use: No    Sexual activity: Not on file   Other Topics Concern    Not on file   Social History Narrative    Not on file     Social Determinants of Health     Financial Resource Strain: Not on file   Food Insecurity: Not on file   Transportation Needs: Not on file   Physical Activity: Not on file   Stress: Not on file   Social Connections: Not on file   Intimate Partner Violence: Not on file   Housing Stability: Not on file         Family History   Problem Relation Age of Onset    Alcohol Abuse Other     Stroke Maternal Grandmother     Stroke Other     Heart Disease Other     No Known Problems Father     Alzheimer's Disease Mother     Stroke Mother     Heart Disease Mother      He has a family history significant for ROS in his mother, no first-degree relatives with DEMI    Allergies   Allergen Reactions    Acetaminophen Other (See Comments)     Not allergic      Asa [Aspirin] Other (See Comments)     Causes asthma attacks      Lisinopril Other (See Comments)         Current Outpatient Medications   Medication Sig    metoprolol succinate (TOPROL XL) 50 MG extended release tablet     Armodafinil (NUVIGIL) 150 MG TABS tablet Take 150 mg by mouth daily.     topiramate (TOPAMAX) 50 MG tablet Take 0.5 tablets by mouth daily as needed (tachycardic episodes)    rosuvastatin (CRESTOR) 10 MG tablet Take 1 tablet by mouth in the morning. TAKE 1 TABLET BY MOUTH EVERY DAY AT NIGHT. albuterol sulfate HFA (PROVENTIL;VENTOLIN;PROAIR) 108 (90 Base) MCG/ACT inhaler Inhale 2 puffs into the lungs every 6 hours as needed for Wheezing INHALE 2 PUFFS BY MOUTH EVERY 4 HOURS AS NEEDED FOR WHEEZE    DULoxetine (CYMBALTA) 60 MG extended release capsule Take 1 capsule by mouth in the morning. Take w/ 30mg Cymbalta daily, for a total daily dose of 90 mg..    olmesartan (BENICAR) 40 MG tablet Take 1 tablet by mouth in the morning. tamsulosin (FLOMAX) 0.4 MG capsule Take 1 capsule by mouth in the morning. TAKE 1 CAPSULE BY MOUTH EVERY DAY Indications: enlarged prostate with urination problem. zoster recombinant adjuvanted vaccine (SHINGRIX) 50 MCG/0.5ML SUSR injection Inject 0.5 mLs into the muscle See Admin Instructions 1 dose now and repeat in 2-6 months    DULoxetine (CYMBALTA) 30 MG extended release capsule TAKE 1 CAPSULE BY MOUTH DAILY. TAKE W/ 60MG CAPSULE FOR TOTAL DAILY DOSE OF 90MG     No current facility-administered medications for this visit. REVIEW OF SYSTEMS    Full sleep history questionnaire was reviewed with the patient and a 14 ROS obtained. Significant positive findings are indicated in the HPI. All other systems were reviewed and are negative. Significant negatives may be indicated in the HPI if pertinent to the present illness. PHYSICAL EXAM:    Vitals:    10/19/22 0914   BP: 126/72   Pulse: (!) 104   Resp: 14   Temp: (!) 96.6 °F (35.9 °C)   SpO2: 99%     Neck Size: Not measured this visit    Constitutional: Pleasant 61 y.o. male, appears well-developed and well-nourished. Not in acute distress. Eyes: Conjunctivae and lids appear normal. No pallor or icterus   Skin: Not diaphoretic. No lesions  HENT: Head normocephalic. Nares patent with normal mucosa, no drainage, oropharynx clear without erythema or exudate. Moist mucous membranes. Mallampati is 4.  Tongue is normal. Patient has adequate dentition, no molar flattening normal palate. Mild retrognathia  Cardiovascular: nl s1/s2, RRR no m/r/g   Pulmonary: Respiratory effort is normal without labored breathing or accessory muscle use. No respiratory distress/cough/wheezng  Neurological: alert , Attention, speech and language are normal. There is facial symmetry. Musculoskeletal:  Moves all extremities equally and no focal weakness noted. Gait is unassisted. Psychiatric: pleasant, mood and affect well adjusted. cooperative and behavior is appropriate. DIAGNOSTIC TESTS:  HST 9/6/2020 by Ronda Camacho shows severe obstructive sleep apnea as above, test reviewed with patient      ASSESSMENT AND PLAN  1. Alcohol dependence, daily use ([facility] [address]  Assessment & Plan:  Ongoing significant alcohol use patient has drinking 6 beers at night, not ready to completely quit but is willing to work towards a goal of cutting back and is going to try to get down to 3 beers at night between now and next visit. We discussed how this will help his nocturia as well as overall sleep quality and possibly sleep apnea control  2. DEMI (obstructive sleep apnea)  Assessment & Plan:  Patient has known severe obstructive sleep apnea, markedly improved symptomatically on APAP 4-20 cm H2O, he is still having breakthrough events with AHI is over 10 on several occasions on average AHI 5.6. He is using his machine avidly over 9 hours at night 99% of the time. As he had severe desaturations on home study we will check an overnight oximetry on this new setting of 9 to 16 cm H2O, change made in office today, also will try to work on alcohol restrictions to see if this helps with AHI overall control. He was on Nuvigil in the past but is doing well currently without it and this will be discontinued. We will see him back in 6 weeks.   Orders:  -     Pulse oximetry, overnight  -     DME - DURABLE MEDICAL EQUIPMENT  3. RLS (restless legs syndrome)  Assessment & Plan:  Patient has had clinical symptoms of restless leg syndrome in the past but this is not a problem currently, and this is not affecting his ability to sleep. Educational handout given and should this become a regular problem we will do blood work at that time. 4. Class 1 obesity due to excess calories with serious comorbidity and body mass index (BMI) of 31.0 to 31.9 in adult  Assessment & Plan:  Association with sleep disordered breathing and weight gain reviewed, hormonal imbalance from poor quality sleep reviewed as well. We discussed loss of significant weight could lead to significant improvement if not resolution of obstructive sleep apnea. He is precontemplative about resuming exercise and this was encouraged, he is technically no longer obese as he was doing well splitting wood. Will monitor. 5. Essential hypertension, benign              A total of 60 was spent today reviewing records prior to the patient appointment, time spent in patient appointment, ordering test and medications, and documenting in the patient record exclusive of procedures.        Lori Richardson MD  Sleep Medicine/Internal Medicine/Pediatrics

## 2022-10-19 NOTE — ASSESSMENT & PLAN NOTE
Ongoing significant alcohol use patient has drinking 6 beers at night, not ready to completely quit but is willing to work towards a goal of cutting back and is going to try to get down to 3 beers at night between now and next visit.   We discussed how this will help his nocturia as well as overall sleep quality and possibly sleep apnea control

## 2022-10-19 NOTE — PATIENT INSTRUCTIONS
It was a pleasure meeting you today. Here are some items that we discussed:    1. Great job using your CPAP, we adjusted your pressures to 9-16 cm H2O and disabled the ramp today. I will order new supplies from [facility]    Address: [address], [place] [facility]  Phone: [phone] [address]  Fax: [phone]       2. Would recommend working to a goal of having no more than 3 beers a night to help with sleep quality, nocturia and weight management. Recommend regular aerobic exercise for for 20 minutes or more 3 times a week, cycling would be excellent. 3.   We will order an overnight oxygen test to see how your oxygen levels are on current settings, we will call you to discuss the results. 4.  Read through the restless leg syndrome handout, if you are having trouble with this in the future give us a call. Lorelei Oppenheim MD  [phone]   Please continue to use your CPAPYou are getting a good response with it. To get the best benefits from CPAP therapy you will need to use your device for all periods of sleep, this includes naps. Please do not drive if you are sleepy. I will see you back in 1-2 months. See your Durable Medical Equipment (DME) provider:  For any mask fit issues and equipment replacement  Use it every time you go to sleep, day or night. Replace your mask cushion, headgear, and filters regularly. Clean your mask daily and the rest every one to two weeks. If any other issues or questions about your use of CPAP, mask, or pressure, arise, please call your medical equipment company. If you are still having issues, please  use [employer] or call to contact our sleep clinic so that we can assist you. If you have issues that are not about your CPAP (example: medication refills or side effects), please call the  at [phone]. Please work on maintaining a healthy weight. A BMI <30 is considered a healthy weight.  Current Body mass index is 29.85 kg/m². . If you are overweight, a loss of 2 pounds per month still comes out to about 25 pounds per year and could allow a reduction in CPAP pressure or even discontinuation of CPAP. Cleaning instructions      Daily:  Cushion on mask: wash with soap/water OR wipe with an alcohol-free baby wipe    Once per Week:  Mask (frame/headgear): wash with soap/water   Filter: check for cleanliness, Replace (do not wash) monthly, or more often whenever chika/dirty    Twice a month   Water Tub / Tubing: soak for 20-30 minutes in solution then rinse          Soaking Options: 1) water/vinegar solution (3 parts water, 1 part vinegar)     Some companies may tell you to add a little bit of bleach. However I find that bleach to be too toxic and difficult to rinse adequately. Supply Replacement Schedule (what insurance will cover)    You may not need to replace all parts this frequently if you are doing proper cleaning. Filters: 2 per month  Nasal Cushion/Pillow: 2 per month  Full Face cushion: 1 per month  Tubin every 3 months  Full Mask: 1 every 6 months  Headgear: 1 every 6 months  Water Chamber: 1 every 6 months  Chinstrap: 1 every 6 months      Many patients struggle to find comfortable heat and humidity settings on their device. If you are having issues with condensation in your mask or tubing, it usually means your temperature is too low, and/or your humidity is too high. If you are experiencing nasal congestion, it usually means you would benefit from higher humidity setting. Most people on nasal pillows are more comfortable if the heat and humidity are relatively high, such as 84-86 degrees, and 5-6 humidity. You can adjust these settings yourself to find what is comfortable for you. You can always call us or your equipment company for help, as well. Contact your equipment vendor for replacement supplies whenever in need.

## 2022-10-19 NOTE — ASSESSMENT & PLAN NOTE
Association with sleep disordered breathing and weight gain reviewed, hormonal imbalance from poor quality sleep reviewed as well. We discussed loss of significant weight could lead to significant improvement if not resolution of obstructive sleep apnea. He is precontemplative about resuming exercise and this was encouraged, he is technically no longer obese as he was doing well splitting wood. Will monitor.

## 2022-11-16 ENCOUNTER — TELEPHONE (OUTPATIENT)
Dept: SLEEP MEDICINE | Age: 67
End: 2022-11-16

## 2022-11-16 NOTE — TELEPHONE ENCOUNTER
Called and left a message, overnight oximetry reviewed from 11/14/2022 shows less than 1 minute less than 88% and GIGI of 21, consistent with download showing poor control at times. No changes to therapy at this time and we will work on him cutting back the amount of alcohol between now and next visit next month. We will decide at that time if we need to modify therapy.

## 2023-02-08 ENCOUNTER — NURSE ONLY (OUTPATIENT)
Dept: FAMILY MEDICINE CLINIC | Facility: CLINIC | Age: 68
End: 2023-02-08

## 2023-02-08 DIAGNOSIS — I10 ESSENTIAL HYPERTENSION, BENIGN: ICD-10-CM

## 2023-02-08 DIAGNOSIS — E78.5 DYSLIPIDEMIA: ICD-10-CM

## 2023-02-08 LAB
ALBUMIN SERPL-MCNC: 4.2 G/DL (ref 3.2–4.6)
ALBUMIN/GLOB SERPL: 1.4 (ref 0.4–1.6)
ALP SERPL-CCNC: 89 U/L (ref 50–136)
ALT SERPL-CCNC: 49 U/L (ref 12–65)
ANION GAP SERPL CALC-SCNC: 9 MMOL/L (ref 2–11)
AST SERPL-CCNC: 34 U/L (ref 15–37)
BILIRUB SERPL-MCNC: 0.6 MG/DL (ref 0.2–1.1)
BUN SERPL-MCNC: 13 MG/DL (ref 8–23)
CALCIUM SERPL-MCNC: 9.7 MG/DL (ref 8.3–10.4)
CHLORIDE SERPL-SCNC: 106 MMOL/L (ref 101–110)
CHOLEST SERPL-MCNC: 208 MG/DL
CO2 SERPL-SCNC: 27 MMOL/L (ref 21–32)
CREAT SERPL-MCNC: 1.1 MG/DL (ref 0.8–1.5)
GLOBULIN SER CALC-MCNC: 3.1 G/DL (ref 2.8–4.5)
GLUCOSE SERPL-MCNC: 115 MG/DL (ref 65–100)
HDLC SERPL-MCNC: 54 MG/DL (ref 40–60)
HDLC SERPL: 3.9
LDLC SERPL CALC-MCNC: 118.6 MG/DL
POTASSIUM SERPL-SCNC: 4.5 MMOL/L (ref 3.5–5.1)
PROT SERPL-MCNC: 7.3 G/DL (ref 6.3–8.2)
SODIUM SERPL-SCNC: 142 MMOL/L (ref 133–143)
TRIGL SERPL-MCNC: 177 MG/DL (ref 35–150)
VLDLC SERPL CALC-MCNC: 35.4 MG/DL (ref 6–23)

## 2023-02-08 NOTE — PROGRESS NOTES
[address] [place] [address] [address], [address] [facility]  [phone]    PATIENT ID    Mr. Maryclare Goldberg  1955  His primary provider is Suresh Haynes    CC: Mr. Shiela Mccollum returns to the clinic today for follow up of his obstructive sleep apnea. INTERVAL HISTORY  Mr. Shiela Mccollum was originally diagnosed with severe obstructive sleep apnea in 2020 with an AHI of 60 and a low SPO2 of 76%. The patient has a medical history significant for overweight with BMI 29, obstructive sleep apnea, anxiety/depression, migraine headaches, allergic conjunctivitis without rhinitis, mild intermittent asthma, hypertension/hyperlipidemia, PACs, RLS. He is here for CPAP follow-up after adjustments to 9-16 cm H2O. Overall he notes he has more energy is not taking naps he is actually started biking as well as splitting wood, he is wondering if biking will have helped his obstructive sleep apnea. Unfortunately did not bring his machine today I do not have download data to review. He is also not made any progress in cutting back on the number of beers roughly 6 a day in the evening, he attributes this to being because now he is more awake and has more time to drink. He has not had any interval change in health or medications otherwise, continues to do well off armodafinil. No new concerns at this time. Past medical and surgical histories, medications and allergies, problem list, and social history were reviewed. From previous visit:  10/19/2020  Mr. Shiela Mccollum is a 61 y.o.  male referred for management of obstructive sleep apnea by his primary care physician Dr. Niranjan Ornelas . He was diagnosed with severe obstructive sleep apnea in September 2020 by home sleep testing [facility] with an AHI 60 SPO2 rayray 76%, he ended up buying a CPAP  at [facility] with APAP 4-20 cm H2O and has been using this with clinical benefit.   Prior to starting CPAP he was tired had difficulty focusing and was struggling with depression. He notes this is changed his life dramatically, he did have trouble initially and had persistent daytime sleepiness and underwent a trial of new hays which did help but he has run out and does not want to restart this as he finds he is managing just well with coffee. He denies any current problems with his nasal N 20 mask. He had actually lost about 15 pounds and was trying to exercise more but has had a bit of a backslide lately. He denies any known snoring when utilizing CPAP, sleeps with his wife. He denies morning headaches but has persistent nocturia, he does drink 6 beers nightly before bed and has had trouble cutting back. He has never seen a sleep physician previously. He has occasional irregular heartbeats in the form of PACs that he notices and does get morning headaches upon awakening but this has been better controlled. He also has a history of migraines for which he is on topiramate and this is under good control. He has occasional insomnia but nothing on a regular basis and is more concerned about getting his sleep apnea under control he has noted improvement in his concentration and forgetfulness since starting on CPAP. He has no more abnormal movements at night no cataplexy or hypnagogic or hypnopompic hallucinations. He does have a history of restless leg symptoms in the past but lately this has not been an issue for him. He denies drowsy driving. Typically he will go to bed around 9 or 10 and gets up by 732 or 9:00 in the morning takes about 30 minutes to fall asleep but wakes up 4-6 times a night voiding at least 2 or 3 times. He notes his asthma is under good control using his rescue inhaler no more than once a month, no history of coronary artery disease or significant arrhythmias but he has had PVCs for quite a while. No major recent hospitalizations or surgeries. ROS   Review of systems was otherwise negative unless noted in HPI.   Sleep Medicine 2/9/2023 10/19/2022   Sitting and reading 1 2   Watching TV 0 0   Sitting, inactive in a public place (e.g. a theatre or a meeting) 0 0   As a passenger in a car for an hour without a break 1 2   Lying down to rest in the afternoon when circumstances permit 2 3   Sitting and talking to someone 0 1   Sitting quietly after a lunch without alcohol 1 3   In a car, while stopped for a few minutes in traffic 0 0   [place] Sleepiness Score 5 11           MEDS  Current Outpatient Medications   Medication Sig Dispense Refill    topiramate (TOPAMAX) 50 MG tablet Take 0.5 tablets by mouth daily as needed (tachycardic episodes) 90 tablet 3    albuterol sulfate HFA (PROVENTIL;VENTOLIN;PROAIR) 108 (90 Base) MCG/ACT inhaler Inhale 2 puffs into the lungs every 6 hours as needed for Wheezing INHALE 2 PUFFS BY MOUTH EVERY 4 HOURS AS NEEDED FOR WHEEZE 18 g 1    DULoxetine (CYMBALTA) 60 MG extended release capsule Take 1 capsule by mouth in the morning. Take w/ 30mg Cymbalta daily, for a total daily dose of 90 mg.. 90 capsule 3    olmesartan (BENICAR) 40 MG tablet Take 1 tablet by mouth in the morning. 90 tablet 3    tamsulosin (FLOMAX) 0.4 MG capsule Take 1 capsule by mouth in the morning. TAKE 1 CAPSULE BY MOUTH EVERY DAY Indications: enlarged prostate with urination problem. 90 capsule 3    zoster recombinant adjuvanted vaccine (SHINGRIX) 50 MCG/0.5ML SUSR injection Inject 0.5 mLs into the muscle See Admin Instructions 1 dose now and repeat in 2-6 months 0.5 mL 0    DULoxetine (CYMBALTA) 30 MG extended release capsule TAKE 1 CAPSULE BY MOUTH DAILY. TAKE W/ 60MG CAPSULE FOR TOTAL DAILY DOSE OF 90MG 90 capsule 3     No current facility-administered medications for this visit.        ALLERGIES  Allergies   Allergen Reactions    Acetaminophen Other (See Comments)     Not allergic      Asa [Aspirin] Other (See Comments)     Causes asthma attacks      Lisinopril Other (See Comments)       OBJECTIVE   BP (!) 124/58   Pulse (!) 109 Temp 97 °F (36.1 °C)   Resp 18   Ht 5' 11\" (1.803 m)   Wt 216 lb 1.6 oz (98 kg)   SpO2 98%   BMI 30.14 kg/m²         Exam:  Constitutional: Pleasant 74 y.o. male, appears well-developed and well-nourished. Not in acute distress. Eyes: Conjunctivae and lids appear normal. No pallor or icterus   Skin: Not diaphoretic. No lesions  HENT: Head normocephalic. Nares appear patent  Pulmonary: Respiratory effort is normal without labored breathing or accessory muscle use. No respiratory distress/cough/wheezng  Neurological: alert , Attention, speech and language are normal. There is facial symmetry. Musculoskeletal:  Moves all extremities equally and no focal weakness noted. Gait is unassisted. Psychiatric: pleasant, mood and affect well adjusted. cooperative and behavior is appropriate. Results: CPAP download not available as he did not bring his machine          ASSESSMENT AND PLAN  1. DEMI (obstructive sleep apnea)  Assessment & Plan:   Clinically patient seems to have a good response to empirically adjusted CPAP 9-16 cm H2O, I have asked him to bring his machine by today or tomorrow so we can review his download and verify this. Oximetry done in November did show occasional desats but overall mild and no changes were made. We will continue current therapy and follow-up in 6 months, I will call him once we receive his download to review. He was congratulated on improving his exercise routine, encouraged him once again to try to cut back on the number of beers as this can lead to poor control of DEMI as well. 2. Class 1 obesity due to excess calories with serious comorbidity and body mass index (BMI) of 31.0 to 31.9 in adult  Assessment & Plan:  Weight has been stable, just up a few pounds he attributes this to increased muscle mass from cycling, we discussed how beers I have nonnutritional calories and can impede weight loss efforts.          He should not drive if drowsy or if he has had inadequate sleep.    I will see him back in 6 months. Time Spent: Time spent total 20 minutes exclusive of procedures, during the 24 hour period of the date of encounter: preparing to see the patient (e.g. reviewing chart notes, tests), performing a medically appropriate examination and/or evaluation, documenting clinical information in the electronic or other health record, counseling and educating the patient/family/caregiver, obtaining and/or reviewing separately obtained history and ordering medications, tests, procedures. CPAP download was reviewed with the patient in detail today.       Katiana Shi MD  Sleep Medicine/Internal Medicine/Pediatrics

## 2023-02-09 ENCOUNTER — OFFICE VISIT (OUTPATIENT)
Dept: SLEEP MEDICINE | Age: 68
End: 2023-02-09

## 2023-02-09 VITALS
HEIGHT: 71 IN | HEART RATE: 109 BPM | TEMPERATURE: 97 F | OXYGEN SATURATION: 98 % | RESPIRATION RATE: 18 BRPM | BODY MASS INDEX: 30.25 KG/M2 | WEIGHT: 216.1 LBS | DIASTOLIC BLOOD PRESSURE: 58 MMHG | SYSTOLIC BLOOD PRESSURE: 124 MMHG

## 2023-02-09 DIAGNOSIS — E66.09 CLASS 1 OBESITY DUE TO EXCESS CALORIES WITH SERIOUS COMORBIDITY AND BODY MASS INDEX (BMI) OF 31.0 TO 31.9 IN ADULT: ICD-10-CM

## 2023-02-09 DIAGNOSIS — G47.33 OSA (OBSTRUCTIVE SLEEP APNEA): Primary | ICD-10-CM

## 2023-02-09 PROCEDURE — 3078F DIAST BP <80 MM HG: CPT | Performed by: INTERNAL MEDICINE

## 2023-02-09 PROCEDURE — 99213 OFFICE O/P EST LOW 20 MIN: CPT | Performed by: INTERNAL MEDICINE

## 2023-02-09 PROCEDURE — 3074F SYST BP LT 130 MM HG: CPT | Performed by: INTERNAL MEDICINE

## 2023-02-09 PROCEDURE — 1123F ACP DISCUSS/DSCN MKR DOCD: CPT | Performed by: INTERNAL MEDICINE

## 2023-02-09 ASSESSMENT — SLEEP AND FATIGUE QUESTIONNAIRES
HOW LIKELY ARE YOU TO NOD OFF OR FALL ASLEEP WHILE SITTING INACTIVE IN A PUBLIC PLACE: 0
ESS TOTAL SCORE: 5
HOW LIKELY ARE YOU TO NOD OFF OR FALL ASLEEP WHILE WATCHING TV: 0
HOW LIKELY ARE YOU TO NOD OFF OR FALL ASLEEP WHILE LYING DOWN TO REST IN THE AFTERNOON WHEN CIRCUMSTANCES PERMIT: 2
HOW LIKELY ARE YOU TO NOD OFF OR FALL ASLEEP WHEN YOU ARE A PASSENGER IN A CAR FOR AN HOUR WITHOUT A BREAK: 1
HOW LIKELY ARE YOU TO NOD OFF OR FALL ASLEEP IN A CAR, WHILE STOPPED FOR A FEW MINUTES IN TRAFFIC: 0
HOW LIKELY ARE YOU TO NOD OFF OR FALL ASLEEP WHILE SITTING AND READING: 1
HOW LIKELY ARE YOU TO NOD OFF OR FALL ASLEEP WHILE SITTING AND TALKING TO SOMEONE: 0
HOW LIKELY ARE YOU TO NOD OFF OR FALL ASLEEP WHILE SITTING QUIETLY AFTER LUNCH WITHOUT ALCOHOL: 1

## 2023-02-09 NOTE — ASSESSMENT & PLAN NOTE
Clinically patient seems to have a good response to empirically adjusted CPAP 9-16 cm H2O, I have asked him to bring his machine by today or tomorrow so we can review his download and verify this. Oximetry done in November did show occasional desats but overall mild and no changes were made. We will continue current therapy and follow-up in 6 months, I will call him once we receive his download to review. He was congratulated on improving his exercise routine, encouraged him once again to try to cut back on the number of beers as this can lead to poor control of DEMI as well.

## 2023-02-09 NOTE — ASSESSMENT & PLAN NOTE
Weight has been stable, just up a few pounds he attributes this to increased muscle mass from cycling, we discussed how beers I have nonnutritional calories and can impede weight loss efforts.

## 2023-09-12 DIAGNOSIS — I10 ESSENTIAL HYPERTENSION, BENIGN: ICD-10-CM

## 2023-09-12 RX ORDER — OLMESARTAN MEDOXOMIL 40 MG/1
40 TABLET ORAL EVERY MORNING
Qty: 90 TABLET | Refills: 3 | OUTPATIENT
Start: 2023-09-12

## 2023-09-17 DIAGNOSIS — F32.1 MODERATE MAJOR DEPRESSION (HCC): ICD-10-CM

## 2023-09-18 RX ORDER — DULOXETIN HYDROCHLORIDE 60 MG/1
60 CAPSULE, DELAYED RELEASE ORAL DAILY
Qty: 90 CAPSULE | Refills: 3 | OUTPATIENT
Start: 2023-09-18

## 2023-09-20 DIAGNOSIS — F32.1 MODERATE MAJOR DEPRESSION (HCC): ICD-10-CM

## 2023-09-20 DIAGNOSIS — G43.109 MIGRAINE WITH AURA AND WITHOUT STATUS MIGRAINOSUS, NOT INTRACTABLE: ICD-10-CM

## 2023-09-20 DIAGNOSIS — F32.1 MAJOR DEPRESSIVE DISORDER, SINGLE EPISODE, MODERATE (HCC): ICD-10-CM

## 2023-09-20 DIAGNOSIS — N13.8 BPH WITH OBSTRUCTION/LOWER URINARY TRACT SYMPTOMS: ICD-10-CM

## 2023-09-20 DIAGNOSIS — I10 ESSENTIAL HYPERTENSION, BENIGN: ICD-10-CM

## 2023-09-20 DIAGNOSIS — N40.1 BPH WITH OBSTRUCTION/LOWER URINARY TRACT SYMPTOMS: ICD-10-CM

## 2023-09-20 NOTE — TELEPHONE ENCOUNTER
Pt is requesting a refill for DULoxetine 30 mg, tamsulosin 0.4mg, olmesartan 40 mg      Pharmacy [facility]  [address] [facility]

## 2023-09-21 RX ORDER — DULOXETIN HYDROCHLORIDE 60 MG/1
60 CAPSULE, DELAYED RELEASE ORAL DAILY
Qty: 90 CAPSULE | Refills: 0 | Status: SHIPPED | OUTPATIENT
Start: 2023-09-21

## 2023-09-21 RX ORDER — OLMESARTAN MEDOXOMIL 40 MG/1
40 TABLET ORAL DAILY
Qty: 90 TABLET | Refills: 0 | Status: SHIPPED | OUTPATIENT
Start: 2023-09-21

## 2023-09-21 RX ORDER — DULOXETIN HYDROCHLORIDE 30 MG/1
CAPSULE, DELAYED RELEASE ORAL
Qty: 90 CAPSULE | Refills: 0 | Status: SHIPPED | OUTPATIENT
Start: 2023-09-21

## 2023-09-21 RX ORDER — TAMSULOSIN HYDROCHLORIDE 0.4 MG/1
0.4 CAPSULE ORAL DAILY
Qty: 90 CAPSULE | Refills: 0 | Status: SHIPPED | OUTPATIENT
Start: 2023-09-21

## 2023-09-21 NOTE — TELEPHONE ENCOUNTER
He missed his appt w/ me last February and is overdue to be seen. Please ask him to schedule:  HTN/Anxiety/Depression f/u w/ labs prior to visit. I'll send in a 3 mos supply of the requested meds to cover him until he can be seen.

## 2023-10-17 DIAGNOSIS — G43.109 MIGRAINE WITH AURA AND WITHOUT STATUS MIGRAINOSUS, NOT INTRACTABLE: ICD-10-CM

## 2023-10-17 RX ORDER — TOPIRAMATE 50 MG/1
25 TABLET, FILM COATED ORAL DAILY PRN
Qty: 90 TABLET | Refills: 3 | Status: SHIPPED | OUTPATIENT
Start: 2023-10-17

## 2023-10-17 NOTE — TELEPHONE ENCOUNTER
PT IS REQUESTING A REFILL FOR TOPAMAX 50 MG    [facility]  [place], [facility]  [address] [facility]

## 2023-11-01 ENCOUNTER — PATIENT MESSAGE (OUTPATIENT)
Dept: FAMILY MEDICINE CLINIC | Facility: CLINIC | Age: 68
End: 2023-11-01

## 2023-11-30 NOTE — ASSESSMENT & PLAN NOTE
Patient has had clinical symptoms of restless leg syndrome in the past but this is not a problem currently, and this is not affecting his ability to sleep. Educational handout given and should this become a regular problem we will do blood work at that time. 16

## 2023-12-21 DIAGNOSIS — I10 ESSENTIAL HYPERTENSION, BENIGN: ICD-10-CM

## 2023-12-21 DIAGNOSIS — N40.1 BPH WITH OBSTRUCTION/LOWER URINARY TRACT SYMPTOMS: ICD-10-CM

## 2023-12-21 DIAGNOSIS — N13.8 BPH WITH OBSTRUCTION/LOWER URINARY TRACT SYMPTOMS: ICD-10-CM

## 2023-12-21 DIAGNOSIS — F32.1 MODERATE MAJOR DEPRESSION (HCC): ICD-10-CM

## 2023-12-21 RX ORDER — DULOXETIN HYDROCHLORIDE 60 MG/1
CAPSULE, DELAYED RELEASE ORAL
Qty: 90 CAPSULE | Refills: 0 | OUTPATIENT
Start: 2023-12-21

## 2023-12-21 RX ORDER — TAMSULOSIN HYDROCHLORIDE 0.4 MG/1
0.4 CAPSULE ORAL DAILY
Qty: 90 CAPSULE | Refills: 0 | OUTPATIENT
Start: 2023-12-21

## 2023-12-21 RX ORDER — OLMESARTAN MEDOXOMIL 40 MG/1
40 TABLET ORAL DAILY
Qty: 90 TABLET | Refills: 0 | OUTPATIENT
Start: 2023-12-21

## 2024-01-19 DIAGNOSIS — N40.1 BPH WITH OBSTRUCTION/LOWER URINARY TRACT SYMPTOMS: ICD-10-CM

## 2024-01-19 DIAGNOSIS — F32.1 MODERATE MAJOR DEPRESSION (HCC): ICD-10-CM

## 2024-01-19 DIAGNOSIS — F32.1 MAJOR DEPRESSIVE DISORDER, SINGLE EPISODE, MODERATE (HCC): ICD-10-CM

## 2024-01-19 DIAGNOSIS — I10 ESSENTIAL HYPERTENSION, BENIGN: ICD-10-CM

## 2024-01-19 DIAGNOSIS — N13.8 BPH WITH OBSTRUCTION/LOWER URINARY TRACT SYMPTOMS: ICD-10-CM

## 2024-01-19 RX ORDER — TAMSULOSIN HYDROCHLORIDE 0.4 MG/1
0.4 CAPSULE ORAL DAILY
Qty: 90 CAPSULE | Refills: 0 | Status: SHIPPED | OUTPATIENT
Start: 2024-01-19 | End: 2024-01-23 | Stop reason: SDUPTHER

## 2024-01-19 RX ORDER — DULOXETIN HYDROCHLORIDE 30 MG/1
CAPSULE, DELAYED RELEASE ORAL
Qty: 90 CAPSULE | Refills: 0 | Status: CANCELLED | OUTPATIENT
Start: 2024-01-19

## 2024-01-19 RX ORDER — OLMESARTAN MEDOXOMIL 40 MG/1
40 TABLET ORAL DAILY
Qty: 90 TABLET | Refills: 0 | Status: SHIPPED | OUTPATIENT
Start: 2024-01-19 | End: 2024-01-23 | Stop reason: SDUPTHER

## 2024-01-19 NOTE — TELEPHONE ENCOUNTER
A 90 day supply of Duloxetine was sent in on 12/18/23, so he should have enough to last through 3/15/24.   I will send in a 90 day supply of his other requested meds, however,   He missed his appt w/ us in February and again in November and has not rescheduled yet.    Please ask him to schedule:  a Welcome To Medicare/HTN/Anxiety/Depression f/u w/ labs prior to visit.    Will need to see before any add'l refills can be provided.

## 2024-01-19 NOTE — TELEPHONE ENCOUNTER
I spoke with patient's spouse who stated that she also need the 60 mg Duloxetine sent to the pharmacy.   She scheduled his missed appointments and also stated that patient had a stroke last week.

## 2024-01-19 NOTE — TELEPHONE ENCOUNTER
Pt wife is requesting a refill for DULoxetine 30 mg, tamsulosin 0.4mg, olmesartan 40mg    [place] [address] [facility]

## 2024-01-22 RX ORDER — DULOXETIN HYDROCHLORIDE 60 MG/1
60 CAPSULE, DELAYED RELEASE ORAL DAILY
Qty: 90 CAPSULE | Refills: 0 | Status: SHIPPED | OUTPATIENT
Start: 2024-01-22 | End: 2024-01-23 | Stop reason: SDUPTHER

## 2024-01-23 ENCOUNTER — OFFICE VISIT (OUTPATIENT)
Dept: FAMILY MEDICINE CLINIC | Facility: CLINIC | Age: 69
End: 2024-01-23
Payer: COMMERCIAL

## 2024-01-23 VITALS
HEART RATE: 86 BPM | OXYGEN SATURATION: 98 % | WEIGHT: 227 LBS | SYSTOLIC BLOOD PRESSURE: 180 MMHG | RESPIRATION RATE: 16 BRPM | DIASTOLIC BLOOD PRESSURE: 100 MMHG | BODY MASS INDEX: 31.66 KG/M2

## 2024-01-23 DIAGNOSIS — N13.8 BPH WITH OBSTRUCTION/LOWER URINARY TRACT SYMPTOMS: ICD-10-CM

## 2024-01-23 DIAGNOSIS — N40.1 BPH WITH OBSTRUCTION/LOWER URINARY TRACT SYMPTOMS: ICD-10-CM

## 2024-01-23 DIAGNOSIS — F32.1 MAJOR DEPRESSIVE DISORDER, SINGLE EPISODE, MODERATE (HCC): ICD-10-CM

## 2024-01-23 DIAGNOSIS — I63.9 CEREBELLAR STROKE (HCC): Primary | ICD-10-CM

## 2024-01-23 DIAGNOSIS — I10 ESSENTIAL HYPERTENSION, BENIGN: ICD-10-CM

## 2024-01-23 DIAGNOSIS — F32.1 MODERATE MAJOR DEPRESSION (HCC): ICD-10-CM

## 2024-01-23 PROCEDURE — 1123F ACP DISCUSS/DSCN MKR DOCD: CPT | Performed by: FAMILY MEDICINE

## 2024-01-23 PROCEDURE — 3080F DIAST BP >= 90 MM HG: CPT | Performed by: FAMILY MEDICINE

## 2024-01-23 PROCEDURE — 3077F SYST BP >= 140 MM HG: CPT | Performed by: FAMILY MEDICINE

## 2024-01-23 PROCEDURE — 99215 OFFICE O/P EST HI 40 MIN: CPT | Performed by: FAMILY MEDICINE

## 2024-01-23 RX ORDER — ATORVASTATIN CALCIUM 80 MG/1
80 TABLET, FILM COATED ORAL
COMMUNITY
Start: 2024-01-14 | End: 2024-01-23 | Stop reason: SDUPTHER

## 2024-01-23 RX ORDER — ATORVASTATIN CALCIUM 80 MG/1
80 TABLET, FILM COATED ORAL DAILY
Qty: 90 TABLET | Refills: 3 | Status: SHIPPED | OUTPATIENT
Start: 2024-01-23 | End: 2025-01-22

## 2024-01-23 RX ORDER — MECLIZINE HYDROCHLORIDE 25 MG/1
25 TABLET ORAL 3 TIMES DAILY PRN
Qty: 90 TABLET | Refills: 3 | Status: SHIPPED | OUTPATIENT
Start: 2024-01-23

## 2024-01-23 RX ORDER — MECLIZINE HYDROCHLORIDE 25 MG/1
25 TABLET ORAL 3 TIMES DAILY PRN
COMMUNITY
Start: 2024-01-14 | End: 2024-01-23 | Stop reason: SDUPTHER

## 2024-01-23 RX ORDER — OLMESARTAN MEDOXOMIL 40 MG/1
40 TABLET ORAL DAILY
Qty: 90 TABLET | Refills: 3 | Status: SHIPPED | OUTPATIENT
Start: 2024-01-23

## 2024-01-23 RX ORDER — CLOPIDOGREL BISULFATE 75 MG/1
75 TABLET ORAL DAILY
Qty: 90 TABLET | Refills: 1 | Status: SHIPPED | OUTPATIENT
Start: 2024-01-23 | End: 2025-01-17

## 2024-01-23 RX ORDER — ASPIRIN 325 MG
325 TABLET ORAL DAILY
COMMUNITY
Start: 2024-01-15 | End: 2024-07-13

## 2024-01-23 RX ORDER — DULOXETIN HYDROCHLORIDE 30 MG/1
CAPSULE, DELAYED RELEASE ORAL
Qty: 90 CAPSULE | Refills: 1 | Status: SHIPPED | OUTPATIENT
Start: 2024-01-23

## 2024-01-23 RX ORDER — TAMSULOSIN HYDROCHLORIDE 0.4 MG/1
0.4 CAPSULE ORAL DAILY
Qty: 90 CAPSULE | Refills: 3 | Status: SHIPPED | OUTPATIENT
Start: 2024-01-23

## 2024-01-23 RX ORDER — CLOPIDOGREL BISULFATE 75 MG/1
75 TABLET ORAL DAILY
COMMUNITY
Start: 2024-01-15 | End: 2024-01-23 | Stop reason: SDUPTHER

## 2024-01-23 RX ORDER — DULOXETIN HYDROCHLORIDE 60 MG/1
60 CAPSULE, DELAYED RELEASE ORAL DAILY
Qty: 90 CAPSULE | Refills: 3 | Status: SHIPPED | OUTPATIENT
Start: 2024-01-23

## 2024-01-23 RX ORDER — METOPROLOL SUCCINATE 50 MG/1
50 TABLET, EXTENDED RELEASE ORAL DAILY
Qty: 90 TABLET | Refills: 1 | Status: SHIPPED | OUTPATIENT
Start: 2024-01-23

## 2024-01-23 RX ORDER — METOPROLOL SUCCINATE 50 MG/1
50 TABLET, EXTENDED RELEASE ORAL DAILY
Qty: 90 TABLET | Refills: 1 | Status: SHIPPED | OUTPATIENT
Start: 2024-01-23 | End: 2024-01-23 | Stop reason: SDUPTHER

## 2024-01-23 NOTE — PATIENT INSTRUCTIONS
Severe constipation (try these in order)  1. Increase fluid intake, at least 6-8 glasses of water a day  2.  Increase fiber, prunes, metamucil  3. Take colace 100mg twice daily  4. Take senna 2 tabs at night  5. Take miralax 17g (powder mixed in liquid daily)  6. Take dulcolax suppository if no bowel movement in 3 days  7. If all these do not work, please call our office

## 2024-01-23 NOTE — PROGRESS NOTES
Maykel Hdez (: 1955) is a 68 y.o. male, established patient, here for evaluation of the following chief complaint(s):  Cerebrovascular Accident       ASSESSMENT/PLAN:  1. Cerebellar stroke (HCC)  -     [facility]  2. Moderate major depression (HCC)  -     DULoxetine (CYMBALTA) 60 MG extended release capsule; Take 1 capsule by mouth daily Take w/ 30mg Cymbalta daily, for a total daily dose of 90 mg., Disp-90 capsule, R-3Normal  3. Essential hypertension, benign  -     olmesartan (BENICAR) 40 MG tablet; Take 1 tablet by mouth daily Need to schedule appt please, Disp-90 tablet, R-3Normal  4. BPH with obstruction/lower urinary tract symptoms  -     tamsulosin (FLOMAX) 0.4 MG capsule; Take 1 capsule by mouth daily Need to schedule an appt please, Disp-90 capsule, R-3Normal  5. Major depressive disorder, single episode, moderate (HCC)  -     DULoxetine (CYMBALTA) 30 MG extended release capsule; TAKE 1 CAPSULE BY MOUTH DAILY . TAKE WITH 60MG CAPSULE FOR TOTAL OF 90 MG, Disp-90 capsule, R-1Normal    Patient seen for hospital follow-up, from outside hospital, 9 days after discharge,  Will refill all medications and will add metoprolol 50 mg XL which previously did control his blood pressure,  Patient's blood pressure is hypertensive today, will add back metoprolol patient to return in 1 month for close follow-up chronic conditions,  Ordered home health PT and OT, patient has cardiology follow-up as well as neurology follow-up status post CVA,  Reiterated importance of taking his medications including his blood pressure medicines, Plavix aspirin and his statin,    SUBJECTIVE/OBJECTIVE:  HPI  Recent hospitalization for cerebellar CVA, patient has a history of alcohol use and uncontrolled hypertension and hyperlipidemia, was not taking any of his prescription medications,  Brought in by wife today for follow-up from hospital,  Patient blood pressure still is elevated, taking olmesartan but

## 2024-01-24 ENCOUNTER — HOME HEALTH ADMISSION (OUTPATIENT)
Dept: HOME HEALTH SERVICES | Facility: HOME HEALTH | Age: 69
End: 2024-01-24
Payer: MEDICARE

## 2024-01-30 ENCOUNTER — HOME CARE VISIT (OUTPATIENT)
Dept: SCHEDULING | Facility: HOME HEALTH | Age: 69
End: 2024-01-30

## 2024-01-30 VITALS
OXYGEN SATURATION: 96 % | TEMPERATURE: 97.7 F | RESPIRATION RATE: 16 BRPM | HEART RATE: 66 BPM | DIASTOLIC BLOOD PRESSURE: 82 MMHG | SYSTOLIC BLOOD PRESSURE: 136 MMHG

## 2024-01-30 PROCEDURE — 0221000100 HH NO PAY CLAIM PROCEDURE

## 2024-01-30 PROCEDURE — G0151 HHCP-SERV OF PT,EA 15 MIN: HCPCS

## 2024-01-30 ASSESSMENT — ENCOUNTER SYMPTOMS
NAUSEA: 1
DYSPNEA ACTIVITY LEVEL: AFTER AMBULATING MORE THAN 20 FT
CONSTIPATION: 1

## 2024-01-31 ENCOUNTER — HOME CARE VISIT (OUTPATIENT)
Dept: HOME HEALTH SERVICES | Facility: HOME HEALTH | Age: 69
End: 2024-01-31

## 2024-02-01 ENCOUNTER — HOME CARE VISIT (OUTPATIENT)
Dept: SCHEDULING | Facility: HOME HEALTH | Age: 69
End: 2024-02-01

## 2024-02-01 VITALS
HEART RATE: 77 BPM | SYSTOLIC BLOOD PRESSURE: 136 MMHG | TEMPERATURE: 98.3 F | RESPIRATION RATE: 16 BRPM | OXYGEN SATURATION: 96 % | DIASTOLIC BLOOD PRESSURE: 82 MMHG

## 2024-02-01 PROCEDURE — G0157 HHC PT ASSISTANT EA 15: HCPCS

## 2024-02-02 ENCOUNTER — HOME CARE VISIT (OUTPATIENT)
Dept: SCHEDULING | Facility: HOME HEALTH | Age: 69
End: 2024-02-02

## 2024-02-02 VITALS
OXYGEN SATURATION: 98 % | TEMPERATURE: 97 F | DIASTOLIC BLOOD PRESSURE: 78 MMHG | HEART RATE: 70 BPM | SYSTOLIC BLOOD PRESSURE: 128 MMHG

## 2024-02-02 PROCEDURE — G0152 HHCP-SERV OF OT,EA 15 MIN: HCPCS

## 2024-02-05 ENCOUNTER — HOME CARE VISIT (OUTPATIENT)
Dept: SCHEDULING | Facility: HOME HEALTH | Age: 69
End: 2024-02-05
Payer: MEDICARE

## 2024-02-05 VITALS
TEMPERATURE: 98.1 F | SYSTOLIC BLOOD PRESSURE: 140 MMHG | HEART RATE: 61 BPM | RESPIRATION RATE: 16 BRPM | DIASTOLIC BLOOD PRESSURE: 88 MMHG | OXYGEN SATURATION: 96 %

## 2024-02-05 PROCEDURE — G0157 HHC PT ASSISTANT EA 15: HCPCS

## 2024-02-07 ENCOUNTER — HOME CARE VISIT (OUTPATIENT)
Dept: HOME HEALTH SERVICES | Facility: HOME HEALTH | Age: 69
End: 2024-02-07
Payer: MEDICARE

## 2024-02-07 ENCOUNTER — HOME CARE VISIT (OUTPATIENT)
Dept: SCHEDULING | Facility: HOME HEALTH | Age: 69
End: 2024-02-07
Payer: MEDICARE

## 2024-02-07 VITALS
DIASTOLIC BLOOD PRESSURE: 70 MMHG | RESPIRATION RATE: 16 BRPM | SYSTOLIC BLOOD PRESSURE: 128 MMHG | TEMPERATURE: 98.2 F | OXYGEN SATURATION: 96 % | HEART RATE: 62 BPM

## 2024-02-07 PROCEDURE — G0157 HHC PT ASSISTANT EA 15: HCPCS

## 2024-02-08 DIAGNOSIS — N40.1 BPH WITH OBSTRUCTION/LOWER URINARY TRACT SYMPTOMS: ICD-10-CM

## 2024-02-08 DIAGNOSIS — N13.8 BPH WITH OBSTRUCTION/LOWER URINARY TRACT SYMPTOMS: ICD-10-CM

## 2024-02-08 DIAGNOSIS — F10.20 ALCOHOL DEPENDENCE, DAILY USE (HCC): ICD-10-CM

## 2024-02-08 DIAGNOSIS — I10 ESSENTIAL HYPERTENSION, BENIGN: Primary | ICD-10-CM

## 2024-02-08 DIAGNOSIS — E78.5 DYSLIPIDEMIA: ICD-10-CM

## 2024-02-12 ENCOUNTER — HOME CARE VISIT (OUTPATIENT)
Dept: SCHEDULING | Facility: HOME HEALTH | Age: 69
End: 2024-02-12
Payer: MEDICARE

## 2024-02-12 VITALS
TEMPERATURE: 98.3 F | OXYGEN SATURATION: 97 % | SYSTOLIC BLOOD PRESSURE: 134 MMHG | RESPIRATION RATE: 16 BRPM | DIASTOLIC BLOOD PRESSURE: 78 MMHG | HEART RATE: 76 BPM

## 2024-02-12 PROCEDURE — G0151 HHCP-SERV OF PT,EA 15 MIN: HCPCS

## 2024-02-20 ENCOUNTER — OFFICE VISIT (OUTPATIENT)
Dept: FAMILY MEDICINE CLINIC | Facility: CLINIC | Age: 69
End: 2024-02-20
Payer: COMMERCIAL

## 2024-02-20 VITALS
HEIGHT: 71 IN | TEMPERATURE: 97.8 F | SYSTOLIC BLOOD PRESSURE: 112 MMHG | DIASTOLIC BLOOD PRESSURE: 76 MMHG | BODY MASS INDEX: 30.35 KG/M2 | HEART RATE: 78 BPM | OXYGEN SATURATION: 99 % | WEIGHT: 216.8 LBS

## 2024-02-20 DIAGNOSIS — N13.8 BPH WITH OBSTRUCTION/LOWER URINARY TRACT SYMPTOMS: ICD-10-CM

## 2024-02-20 DIAGNOSIS — F32.1 MAJOR DEPRESSIVE DISORDER, SINGLE EPISODE, MODERATE (HCC): ICD-10-CM

## 2024-02-20 DIAGNOSIS — J45.20 MILD INTERMITTENT ASTHMA WITHOUT COMPLICATION: ICD-10-CM

## 2024-02-20 DIAGNOSIS — I10 ESSENTIAL HYPERTENSION, BENIGN: ICD-10-CM

## 2024-02-20 DIAGNOSIS — I63.9 CEREBELLAR STROKE (HCC): Primary | ICD-10-CM

## 2024-02-20 DIAGNOSIS — Z23 NEEDS FLU SHOT: ICD-10-CM

## 2024-02-20 DIAGNOSIS — N40.1 BPH WITH OBSTRUCTION/LOWER URINARY TRACT SYMPTOMS: ICD-10-CM

## 2024-02-20 PROCEDURE — 3074F SYST BP LT 130 MM HG: CPT | Performed by: FAMILY MEDICINE

## 2024-02-20 PROCEDURE — 90694 VACC AIIV4 NO PRSRV 0.5ML IM: CPT | Performed by: FAMILY MEDICINE

## 2024-02-20 PROCEDURE — 90471 IMMUNIZATION ADMIN: CPT | Performed by: FAMILY MEDICINE

## 2024-02-20 PROCEDURE — G8427 DOCREV CUR MEDS BY ELIG CLIN: HCPCS | Performed by: FAMILY MEDICINE

## 2024-02-20 PROCEDURE — 3078F DIAST BP <80 MM HG: CPT | Performed by: FAMILY MEDICINE

## 2024-02-20 PROCEDURE — 1036F TOBACCO NON-USER: CPT | Performed by: FAMILY MEDICINE

## 2024-02-20 PROCEDURE — G8484 FLU IMMUNIZE NO ADMIN: HCPCS | Performed by: FAMILY MEDICINE

## 2024-02-20 PROCEDURE — 1123F ACP DISCUSS/DSCN MKR DOCD: CPT | Performed by: FAMILY MEDICINE

## 2024-02-20 PROCEDURE — 99214 OFFICE O/P EST MOD 30 MIN: CPT | Performed by: FAMILY MEDICINE

## 2024-02-20 PROCEDURE — 3017F COLORECTAL CA SCREEN DOC REV: CPT | Performed by: FAMILY MEDICINE

## 2024-02-20 PROCEDURE — G8417 CALC BMI ABV UP PARAM F/U: HCPCS | Performed by: FAMILY MEDICINE

## 2024-02-20 RX ORDER — ARMODAFINIL 150 MG/1
150 TABLET ORAL DAILY
Qty: 30 TABLET | Refills: 5 | Status: SHIPPED | OUTPATIENT
Start: 2024-02-20 | End: 2024-08-18

## 2024-02-20 RX ORDER — METOPROLOL SUCCINATE 50 MG/1
50 TABLET, EXTENDED RELEASE ORAL DAILY
Qty: 90 TABLET | Refills: 1 | Status: SHIPPED | OUTPATIENT
Start: 2024-02-20

## 2024-02-20 RX ORDER — ALBUTEROL SULFATE 90 UG/1
2 AEROSOL, METERED RESPIRATORY (INHALATION) EVERY 6 HOURS PRN
Qty: 18 G | Refills: 1 | Status: SHIPPED | OUTPATIENT
Start: 2024-02-20

## 2024-02-20 SDOH — ECONOMIC STABILITY: HOUSING INSECURITY
IN THE LAST 12 MONTHS, WAS THERE A TIME WHEN YOU DID NOT HAVE A STEADY PLACE TO SLEEP OR SLEPT IN A SHELTER (INCLUDING NOW)?: NO

## 2024-02-20 SDOH — ECONOMIC STABILITY: FOOD INSECURITY: WITHIN THE PAST 12 MONTHS, YOU WORRIED THAT YOUR FOOD WOULD RUN OUT BEFORE YOU GOT MONEY TO BUY MORE.: NEVER TRUE

## 2024-02-20 SDOH — ECONOMIC STABILITY: FOOD INSECURITY: WITHIN THE PAST 12 MONTHS, THE FOOD YOU BOUGHT JUST DIDN'T LAST AND YOU DIDN'T HAVE MONEY TO GET MORE.: NEVER TRUE

## 2024-02-20 SDOH — ECONOMIC STABILITY: INCOME INSECURITY: HOW HARD IS IT FOR YOU TO PAY FOR THE VERY BASICS LIKE FOOD, HOUSING, MEDICAL CARE, AND HEATING?: NOT HARD AT ALL

## 2024-02-20 ASSESSMENT — PATIENT HEALTH QUESTIONNAIRE - PHQ9
SUM OF ALL RESPONSES TO PHQ QUESTIONS 1-9: 2
SUM OF ALL RESPONSES TO PHQ9 QUESTIONS 1 & 2: 2
SUM OF ALL RESPONSES TO PHQ QUESTIONS 1-9: 2
1. LITTLE INTEREST OR PLEASURE IN DOING THINGS: 1
SUM OF ALL RESPONSES TO PHQ QUESTIONS 1-9: 2
2. FEELING DOWN, DEPRESSED OR HOPELESS: 1
SUM OF ALL RESPONSES TO PHQ QUESTIONS 1-9: 2

## 2024-02-20 NOTE — PROGRESS NOTES
congestion or rhinorrhea.      Mouth/Throat:      Mouth: Mucous membranes are moist.      Pharynx: No oropharyngeal exudate or posterior oropharyngeal erythema.   Eyes:      General:         Right eye: No discharge.         Left eye: No discharge.      Extraocular Movements: Extraocular movements intact.      Conjunctiva/sclera: Conjunctivae normal.      Pupils: Pupils are equal, round, and reactive to light.   Cardiovascular:      Rate and Rhythm: Normal rate.      Pulses: Normal pulses.   Pulmonary:      Effort: Pulmonary effort is normal. No respiratory distress.   Abdominal:      General: Abdomen is flat.      Palpations: Abdomen is soft.   Musculoskeletal:      Cervical back: Normal range of motion and neck supple. No rigidity.   Neurological:      General: No focal deficit present.      Mental Status: He is alert and oriented to person, place, and time. Mental status is at baseline.      Cranial Nerves: No cranial nerve deficit.      Sensory: No sensory deficit.      Motor: No weakness.      Coordination: Coordination normal.      Gait: Gait normal.      Deep Tendon Reflexes: Reflexes normal.   Psychiatric:         Mood and Affect: Mood normal.       On this date 02/20/24  I have spent 30 minutes reviewing previous notes, test results and face to face with the patient discussing the diagnosis and importance of compliance with the treatment plan as well as documenting on the day of the visit.      An electronic signature was used to authenticate this note.  -- Alexandrea Flaherty MD

## 2024-02-21 ENCOUNTER — TELEPHONE (OUTPATIENT)
Dept: FAMILY MEDICINE CLINIC | Facility: CLINIC | Age: 69
End: 2024-02-21

## 2024-02-21 NOTE — TELEPHONE ENCOUNTER
rior Authorization not required for patient/medication   Case ID: [email or web address]      Payer: Auto Search Patient's Payer    [phone]   [facility] this prior auth will not be required.   View History     Medication Being Authorized     Armodafinil (NUVIGIL) 150 MG TABS tablet    Take 1 tablet by mouth daily for 180 days. 1 po QAM --For Excessive Daytime Somnolence Max Daily Amount: 150 mg    Dispense: 30 tablet Refills: 5     Start: 2/20/2024 End: 8/18/2024     Class: Normal Diagnoses: BPH with obstruction/lower urinary tract symptoms     This order has been released to its destination.   To be filled at: [facility] [id number] [place] [address] [address] - P [phone] - F [phone]

## 2024-02-22 ENCOUNTER — TELEPHONE (OUTPATIENT)
Dept: FAMILY MEDICINE CLINIC | Facility: CLINIC | Age: 69
End: 2024-02-22

## 2024-02-22 NOTE — TELEPHONE ENCOUNTER
Pt called stating his wife dropped off something about needing a letter to the [employer] about continuing legal classes. Pt called back wanting to let Dr. Flaherty he does not need this anymore.

## 2024-02-27 ENCOUNTER — TELEPHONE (OUTPATIENT)
Dept: FAMILY MEDICINE CLINIC | Facility: CLINIC | Age: 69
End: 2024-02-27

## 2024-02-27 NOTE — TELEPHONE ENCOUNTER
Tried to call the patient, but the patient's VM is not set up.  Could not leave a message for him to call the office.

## 2024-02-27 NOTE — TELEPHONE ENCOUNTER
PA Detail   Prior Authorization not required for patient/medication   Case ID: [email or web address]      Payer: Auto Search Patient's Payer    [phone]   [facility] this prior auth will not be required.   View History     Medication Being Authorized     Armodafinil (NUVIGIL) 150 MG TABS tablet    Take 1 tablet by mouth daily for 180 days. 1 po QAM --For Excessive Daytime Somnolence Max Daily Amount: 150 mg    Dispense: 30 tablet Refills: 5     Start: 2/20/2024 End: 8/18/2024     Class: Normal Diagnoses: BPH with obstruction/lower urinary tract symptoms     This order has been released to its destination.   To be filled at: [facility] [id number] [place] [address] [address] - P [phone] - F [phone]        Prior Authorization History for Armodafinil (NUVIGIL) 150 MG TABS tablet    1 year ago Closed - Prior Authorization not required for patient/medication

## 2024-03-01 NOTE — TELEPHONE ENCOUNTER
3rd attempt to call the patient. Could not leave a message vm not set up.      Letter sent to the patient at the address in his chart.

## 2024-03-11 ENCOUNTER — NURSE ONLY (OUTPATIENT)
Dept: FAMILY MEDICINE CLINIC | Facility: CLINIC | Age: 69
End: 2024-03-11

## 2024-03-11 DIAGNOSIS — N40.1 BPH WITH OBSTRUCTION/LOWER URINARY TRACT SYMPTOMS: ICD-10-CM

## 2024-03-11 DIAGNOSIS — I10 ESSENTIAL HYPERTENSION, BENIGN: ICD-10-CM

## 2024-03-11 DIAGNOSIS — N13.8 BPH WITH OBSTRUCTION/LOWER URINARY TRACT SYMPTOMS: ICD-10-CM

## 2024-03-11 DIAGNOSIS — E78.5 DYSLIPIDEMIA: ICD-10-CM

## 2024-03-11 LAB
ALBUMIN SERPL-MCNC: 4.4 G/DL (ref 3.2–4.6)
ALBUMIN/GLOB SERPL: 1.3 (ref 0.4–1.6)
ALP SERPL-CCNC: 83 U/L (ref 50–136)
ALT SERPL-CCNC: 48 U/L (ref 12–65)
ANION GAP SERPL CALC-SCNC: 5 MMOL/L (ref 2–11)
AST SERPL-CCNC: 29 U/L (ref 15–37)
BILIRUB SERPL-MCNC: 0.8 MG/DL (ref 0.2–1.1)
BUN SERPL-MCNC: 16 MG/DL (ref 8–23)
CALCIUM SERPL-MCNC: 9.6 MG/DL (ref 8.3–10.4)
CHLORIDE SERPL-SCNC: 104 MMOL/L (ref 103–113)
CHOLEST SERPL-MCNC: 124 MG/DL
CO2 SERPL-SCNC: 30 MMOL/L (ref 21–32)
CREAT SERPL-MCNC: 1.1 MG/DL (ref 0.8–1.5)
GLOBULIN SER CALC-MCNC: 3.5 G/DL (ref 2.8–4.5)
GLUCOSE SERPL-MCNC: 102 MG/DL (ref 65–100)
HDLC SERPL-MCNC: 44 MG/DL (ref 40–60)
HDLC SERPL: 2.8
LDLC SERPL CALC-MCNC: 43.8 MG/DL
POTASSIUM SERPL-SCNC: 4.2 MMOL/L (ref 3.5–5.1)
PROT SERPL-MCNC: 7.9 G/DL (ref 6.3–8.2)
SODIUM SERPL-SCNC: 139 MMOL/L (ref 136–146)
TRIGL SERPL-MCNC: 181 MG/DL (ref 35–150)
VLDLC SERPL CALC-MCNC: 36.2 MG/DL (ref 6–23)

## 2024-03-13 LAB — PSA SERPL DL<=0.01 NG/ML-MCNC: 1.5 NG/ML (ref 0–4)

## 2024-03-22 ENCOUNTER — OFFICE VISIT (OUTPATIENT)
Dept: FAMILY MEDICINE CLINIC | Facility: CLINIC | Age: 69
End: 2024-03-22
Payer: MEDICARE

## 2024-03-22 VITALS
HEIGHT: 71 IN | HEART RATE: 75 BPM | DIASTOLIC BLOOD PRESSURE: 94 MMHG | BODY MASS INDEX: 31.01 KG/M2 | WEIGHT: 221.5 LBS | TEMPERATURE: 98.4 F | OXYGEN SATURATION: 98 % | SYSTOLIC BLOOD PRESSURE: 152 MMHG

## 2024-03-22 DIAGNOSIS — I10 ESSENTIAL HYPERTENSION, BENIGN: Primary | ICD-10-CM

## 2024-03-22 PROCEDURE — 1123F ACP DISCUSS/DSCN MKR DOCD: CPT

## 2024-03-22 PROCEDURE — 3080F DIAST BP >= 90 MM HG: CPT

## 2024-03-22 PROCEDURE — G8484 FLU IMMUNIZE NO ADMIN: HCPCS

## 2024-03-22 PROCEDURE — 3077F SYST BP >= 140 MM HG: CPT

## 2024-03-22 PROCEDURE — 1036F TOBACCO NON-USER: CPT

## 2024-03-22 PROCEDURE — G8417 CALC BMI ABV UP PARAM F/U: HCPCS

## 2024-03-22 PROCEDURE — 3017F COLORECTAL CA SCREEN DOC REV: CPT

## 2024-03-22 PROCEDURE — 99212 OFFICE O/P EST SF 10 MIN: CPT

## 2024-03-22 PROCEDURE — G8427 DOCREV CUR MEDS BY ELIG CLIN: HCPCS

## 2024-03-22 NOTE — PATIENT INSTRUCTIONS
Would recommend taking Metoprolol 50 mg in the morning and 25 mg in the evening.     Continue with Olmesartan 40 mg daily.     Continue monitoring your blood pressures. Goal is at least less than 140/90.

## 2024-03-22 NOTE — PROGRESS NOTES
Maykel Hdez (: 1955) is a 68 y.o. male, established patient, here for evaluation of the following chief complaint(s):  Dizziness (Still having vertigo following stroke, also very fatigued) and Hypertension (Doesn't think medication has been working)       ASSESSMENT/PLAN:  1. Essential hypertension, benign    Instructed to take Metoprolol 50 mg in the morning and 25 mg in the evening. Continue with Olmesartan. Goal is less than 140/90. F/U in 2 weeks.     SUBJECTIVE/OBJECTIVE:  Dizziness    Hypertension        Hypertension - not well controlled, takes medications as prescribed.  denies chest pain, shortness of breath, abdominal pain, nausea, vomiting, diarrhea, fainting, headaches or vision changes. Has experienced dizziness although he relays it is due to past CVA.    Hypertension Medications       Beta Blockers Cardio-Selective       metoprolol succinate (TOPROL XL) 50 MG extended release tablet Take 1 tablet by mouth daily       Angiotensin II Receptor Antagonists       olmesartan (BENICAR) 40 MG tablet Take 1 tablet by mouth daily Need to schedule appt please     Patient taking differently: Take 1 tablet by mouth daily               Vitals:    24 1626 24 1652   BP: (!) 156/102 (!) 152/94   Pulse: 75    Temp: 98.4 °F (36.9 °C)    SpO2: 98%    Weight: 100.5 kg (221 lb 8 oz)    Height: 1.791 m (5' 10.5\")           Physical Exam  Constitutional:       Appearance: Normal appearance.   Pulmonary:      Effort: Pulmonary effort is normal.   Skin:     General: Skin is warm and dry.   Neurological:      Mental Status: He is alert and oriented to person, place, and time.   Psychiatric:         Mood and Affect: Mood normal.         Behavior: Behavior normal.       Return in about 2 weeks (around 2024) for blood pressure.      An electronic signature was used to authenticate this note.  -- Clare Cowart, DOTTIE - CNP

## 2024-04-05 ENCOUNTER — OFFICE VISIT (OUTPATIENT)
Dept: FAMILY MEDICINE CLINIC | Facility: CLINIC | Age: 69
End: 2024-04-05
Payer: MEDICARE

## 2024-04-05 VITALS
RESPIRATION RATE: 16 BRPM | WEIGHT: 220 LBS | HEIGHT: 71 IN | DIASTOLIC BLOOD PRESSURE: 74 MMHG | BODY MASS INDEX: 30.8 KG/M2 | SYSTOLIC BLOOD PRESSURE: 130 MMHG | OXYGEN SATURATION: 97 % | TEMPERATURE: 98.8 F | HEART RATE: 87 BPM

## 2024-04-05 DIAGNOSIS — I10 ESSENTIAL HYPERTENSION, BENIGN: Primary | ICD-10-CM

## 2024-04-05 PROCEDURE — 1123F ACP DISCUSS/DSCN MKR DOCD: CPT

## 2024-04-05 PROCEDURE — 3017F COLORECTAL CA SCREEN DOC REV: CPT

## 2024-04-05 PROCEDURE — G8417 CALC BMI ABV UP PARAM F/U: HCPCS

## 2024-04-05 PROCEDURE — 99212 OFFICE O/P EST SF 10 MIN: CPT

## 2024-04-05 PROCEDURE — 3078F DIAST BP <80 MM HG: CPT

## 2024-04-05 PROCEDURE — G8427 DOCREV CUR MEDS BY ELIG CLIN: HCPCS

## 2024-04-05 PROCEDURE — 3075F SYST BP GE 130 - 139MM HG: CPT

## 2024-04-05 PROCEDURE — 1036F TOBACCO NON-USER: CPT

## 2024-04-05 NOTE — PROGRESS NOTES
Maykel Hdez (: 1955) is a 68 y.o. male, established patient, here for evaluation of the following chief complaint(s):  No chief complaint on file.       ASSESSMENT/PLAN:  1. Essential hypertension, benign    SUBJECTIVE/OBJECTIVE:  HPI    BP check:     Seen on 3/22: instructed to do Metoprolol 50 mg in the AM and 25 mg in the PM, continue Olmesartan.     At today's visit:     Has been taking medication as discussed in last visit. Relays he has been getting 's-170's and DBP 90's-100 at home. He is very anxious in the room today. Retook BP and obtained 130/74. He is using an upper arm cuff at home and relays it is fairly new. He is concerned that he is obtaining much higher pressures at home vs the office. He would like another 2 week F/U to discuss. Recommended he obtain another upper arm automatic cuff, rest for 5-10 minutes, and take BP daily for the next two weeks. Encouraged he bring a log at his next visit. Attempted to reassure patient of BP today.     Vitals:    24 0932 24 0946   BP: 130/80 130/74   Site: Left Upper Arm    Position: Sitting    Cuff Size: Medium Adult    Pulse: 87    Resp: 16    Temp: 98.8 °F (37.1 °C)    TempSrc: Oral    SpO2: 97%    Weight: 99.8 kg (220 lb)    Height: 1.791 m (5' 10.5\")         Physical Exam  Constitutional:       Appearance: Normal appearance.   Cardiovascular:      Rate and Rhythm: Normal rate and regular rhythm.      Heart sounds: Normal heart sounds.   Pulmonary:      Effort: Pulmonary effort is normal.   Skin:     General: Skin is warm and dry.   Neurological:      Mental Status: He is alert.   Psychiatric:         Mood and Affect: Mood is anxious.       Return in about 2 weeks (around 2024) for blood pressure.      An electronic signature was used to authenticate this note.  -- DOTTIE Armstrong - CNP

## 2024-04-15 DIAGNOSIS — N40.1 BPH WITH OBSTRUCTION/LOWER URINARY TRACT SYMPTOMS: ICD-10-CM

## 2024-04-15 DIAGNOSIS — I10 ESSENTIAL HYPERTENSION, BENIGN: ICD-10-CM

## 2024-04-15 DIAGNOSIS — N13.8 BPH WITH OBSTRUCTION/LOWER URINARY TRACT SYMPTOMS: ICD-10-CM

## 2024-04-15 RX ORDER — TAMSULOSIN HYDROCHLORIDE 0.4 MG/1
0.4 CAPSULE ORAL DAILY
Qty: 90 CAPSULE | Refills: 3 | OUTPATIENT
Start: 2024-04-15

## 2024-04-15 RX ORDER — OLMESARTAN MEDOXOMIL 40 MG/1
40 TABLET ORAL DAILY
Qty: 90 TABLET | Refills: 3 | OUTPATIENT
Start: 2024-04-15

## 2024-04-15 NOTE — TELEPHONE ENCOUNTER
[facility] Ol[facility]artan for patient.    ROSE Hernandez patient, who would like her patients to call for refills.

## 2024-04-19 ENCOUNTER — OFFICE VISIT (OUTPATIENT)
Dept: FAMILY MEDICINE CLINIC | Facility: CLINIC | Age: 69
End: 2024-04-19
Payer: MEDICARE

## 2024-04-19 VITALS
DIASTOLIC BLOOD PRESSURE: 70 MMHG | BODY MASS INDEX: 30.83 KG/M2 | WEIGHT: 220.25 LBS | HEART RATE: 90 BPM | TEMPERATURE: 98 F | HEIGHT: 71 IN | SYSTOLIC BLOOD PRESSURE: 122 MMHG | OXYGEN SATURATION: 100 %

## 2024-04-19 DIAGNOSIS — I10 ESSENTIAL HYPERTENSION, BENIGN: Primary | ICD-10-CM

## 2024-04-19 PROCEDURE — G8417 CALC BMI ABV UP PARAM F/U: HCPCS

## 2024-04-19 PROCEDURE — G8427 DOCREV CUR MEDS BY ELIG CLIN: HCPCS

## 2024-04-19 PROCEDURE — 99212 OFFICE O/P EST SF 10 MIN: CPT

## 2024-04-19 PROCEDURE — 3017F COLORECTAL CA SCREEN DOC REV: CPT

## 2024-04-19 PROCEDURE — 1123F ACP DISCUSS/DSCN MKR DOCD: CPT

## 2024-04-19 PROCEDURE — 3074F SYST BP LT 130 MM HG: CPT

## 2024-04-19 PROCEDURE — 1036F TOBACCO NON-USER: CPT

## 2024-04-19 PROCEDURE — 3078F DIAST BP <80 MM HG: CPT

## 2024-04-19 NOTE — PROGRESS NOTES
Maykel Hdez (: 1955) is a 68 y.o. male, established patient, here for evaluation of the following chief complaint(s):  Hypertension (Walked to his appointment, approx 25 min. )       ASSESSMENT/PLAN:  1. Essential hypertension, benign    SUBJECTIVE/OBJECTIVE:  HPI    F/U BP check:     Was seen originally on 3/22, instructed to take Metoprolol 50 mg in the AM and 25 mg in the PM along with Olmesartan 40mg.     F/U on - BP was 130/74 in office. Relayed he had been getting 's-170's at home. He was very anxious in the room, requested another 2 week F/U.     At today's visit:     He walked to the office, about 25 minute walk d/t car troubles. He brought his BP log from home- average 's-170 and DBP 90's-100. He denies frequent headaches, blurred vision, chest pains.     BP on left arm: 120/66, HR 93 (had been sitting for about 5 minutes)  BP on right arm: 122/70, HR 90    Relays he had been using his wife's cuff, so it was not a brand new cuff. He will purchase a new cuff and continue to monitor. Will not make any adjustments today.    Vitals:    24 0903 24 0913   BP: 120/66 122/70   Pulse: 93 90   Temp: 98 °F (36.7 °C)    SpO2: 100%    Weight: 99.9 kg (220 lb 4 oz)    Height: 1.791 m (5' 10.5\")         Physical Exam  Constitutional:       Appearance: Normal appearance.      Comments: Slightly diaphoretic from walking. No pallor, chest pains, dizziness.   Cardiovascular:      Rate and Rhythm: Normal rate and regular rhythm.   Pulmonary:      Effort: Pulmonary effort is normal.   Skin:     General: Skin is warm.   Neurological:      Mental Status: He is alert and oriented to person, place, and time.   Psychiatric:         Mood and Affect: Mood normal.         Behavior: Behavior normal.       Return in about 4 months (around 2024).    An electronic signature was used to authenticate this note.  -- Clare Cowart, APRN - CNP

## 2024-07-16 DIAGNOSIS — N13.8 BPH WITH OBSTRUCTION/LOWER URINARY TRACT SYMPTOMS: ICD-10-CM

## 2024-07-16 DIAGNOSIS — N40.1 BPH WITH OBSTRUCTION/LOWER URINARY TRACT SYMPTOMS: ICD-10-CM

## 2024-07-16 DIAGNOSIS — E78.5 DYSLIPIDEMIA: ICD-10-CM

## 2024-07-16 DIAGNOSIS — I10 ESSENTIAL HYPERTENSION, BENIGN: Primary | ICD-10-CM

## 2024-07-16 PROBLEM — I63.9 CRYPTOGENIC STROKE (HCC): Status: ACTIVE | Noted: 2024-02-27

## 2024-08-19 PROBLEM — Z86.73 H/O: STROKE: Status: ACTIVE | Noted: 2024-02-27

## 2024-09-07 DIAGNOSIS — N40.1 BPH WITH OBSTRUCTION/LOWER URINARY TRACT SYMPTOMS: ICD-10-CM

## 2024-09-07 DIAGNOSIS — N13.8 BPH WITH OBSTRUCTION/LOWER URINARY TRACT SYMPTOMS: ICD-10-CM

## 2024-09-09 RX ORDER — ARMODAFINIL 150 MG/1
TABLET ORAL
Qty: 30 TABLET | OUTPATIENT
Start: 2024-09-09

## 2024-09-10 DIAGNOSIS — N40.1 BPH WITH OBSTRUCTION/LOWER URINARY TRACT SYMPTOMS: ICD-10-CM

## 2024-09-10 DIAGNOSIS — N13.8 BPH WITH OBSTRUCTION/LOWER URINARY TRACT SYMPTOMS: ICD-10-CM

## 2024-09-11 ENCOUNTER — PATIENT MESSAGE (OUTPATIENT)
Dept: FAMILY MEDICINE CLINIC | Facility: CLINIC | Age: 69
End: 2024-09-11

## 2024-09-11 RX ORDER — ARMODAFINIL 150 MG/1
TABLET ORAL
Qty: 30 TABLET | Refills: 1 | OUTPATIENT
Start: 2024-09-11

## 2024-09-11 NOTE — TELEPHONE ENCOUNTER
Rogelio Govea requesting aromdafinil 150 mg last filled on 2/20/24 with 30 tablets and 5 refills by Dr. Flaherty.     Last seen on 4/19/24 with Clare Cowart.  Patient had a lab and a Welcome to Medicare scheduled but that was cancelled.     MA tried to call the patient, but VM not set up.     MA sent a link via [employer] to have the patient set up labs and MCW.

## 2024-09-11 NOTE — TELEPHONE ENCOUNTER
Pt recently missed his appt w/ me.  Please ask him to reschedule visit for\"   Welcome to Medicare/HTN/HLD mgt w/ labs prior to visit.  Since this is a  controlled medication, he will need a visit for a refill.

## 2024-10-15 ENCOUNTER — TELEPHONE (OUTPATIENT)
Dept: FAMILY MEDICINE CLINIC | Facility: CLINIC | Age: 69
End: 2024-10-15

## 2024-10-15 DIAGNOSIS — N13.8 BPH WITH OBSTRUCTION/LOWER URINARY TRACT SYMPTOMS: ICD-10-CM

## 2024-10-15 DIAGNOSIS — N40.1 BPH WITH OBSTRUCTION/LOWER URINARY TRACT SYMPTOMS: ICD-10-CM

## 2024-10-15 NOTE — TELEPHONE ENCOUNTER
I haven't personally seen Mr. Hdez in >2 years and he missed his appt w/ me on 8/20/24.  He will need to wait until his upcoming visit in November for this refill since it's a controlled substance.

## 2024-10-15 NOTE — TELEPHONE ENCOUNTER
Medication Refill Request    Name of Medication : Armodafinil    Strength of Medication: 150 MG    Directions: : Take 1 tablet by mouth daily for 180 days     30 day or 90 day supply:     [facility] [address] [facility] P [phone] - F [phone]    Last Appt. Date: 8/20/24    Next Appt. Date:     Additional Information For Provider: Will make an appt as well

## 2024-10-16 RX ORDER — METOPROLOL SUCCINATE 50 MG/1
50 TABLET, EXTENDED RELEASE ORAL DAILY
Qty: 90 TABLET | Refills: 1 | OUTPATIENT
Start: 2024-10-16

## 2024-10-16 NOTE — TELEPHONE ENCOUNTER
Patient called the office again today stating that he can not function without this medication.  He said that he has been seen here twice in the last couple years and feels like he has been a good patient for 20 years.  He said that he did not realize he missed an appointment in August.   Patient said he promises to keep the appointment that was set up for November 21, 2024 and asked that you please reconsider sending the requested medication to his pharmacy.   Surgery orders are in for 3/25/2024.  Please call patient with prework information.  Confirm he is not taking anticoagulation or Plavix.

## 2024-10-17 RX ORDER — ARMODAFINIL 150 MG/1
150 TABLET ORAL DAILY
Qty: 30 TABLET | Refills: 1 | Status: SHIPPED | OUTPATIENT
Start: 2024-10-17 | End: 2025-04-15

## 2024-10-21 NOTE — TELEPHONE ENCOUNTER
Prior authorization was submitted for Armodafinil, but patient's insurance denied coverage 10/21/2024

## 2024-11-01 DIAGNOSIS — N13.8 BPH WITH OBSTRUCTION/LOWER URINARY TRACT SYMPTOMS: ICD-10-CM

## 2024-11-01 DIAGNOSIS — I10 ESSENTIAL HYPERTENSION, BENIGN: Primary | ICD-10-CM

## 2024-11-01 DIAGNOSIS — E78.5 DYSLIPIDEMIA: ICD-10-CM

## 2024-11-01 DIAGNOSIS — N40.1 BPH WITH OBSTRUCTION/LOWER URINARY TRACT SYMPTOMS: ICD-10-CM

## 2024-11-11 ENCOUNTER — LAB (OUTPATIENT)
Dept: FAMILY MEDICINE CLINIC | Facility: CLINIC | Age: 69
End: 2024-11-11

## 2024-11-11 DIAGNOSIS — I10 ESSENTIAL HYPERTENSION, BENIGN: ICD-10-CM

## 2024-11-11 DIAGNOSIS — N40.1 BPH WITH OBSTRUCTION/LOWER URINARY TRACT SYMPTOMS: ICD-10-CM

## 2024-11-11 DIAGNOSIS — E78.5 DYSLIPIDEMIA: ICD-10-CM

## 2024-11-11 DIAGNOSIS — N13.8 BPH WITH OBSTRUCTION/LOWER URINARY TRACT SYMPTOMS: ICD-10-CM

## 2024-11-11 LAB
ALBUMIN SERPL-MCNC: 4 G/DL (ref 3.2–4.6)
ALBUMIN/GLOB SERPL: 1.3 (ref 1–1.9)
ALP SERPL-CCNC: 82 U/L (ref 40–129)
ALT SERPL-CCNC: 54 U/L (ref 8–55)
ANION GAP SERPL CALC-SCNC: 14 MMOL/L (ref 7–16)
AST SERPL-CCNC: 36 U/L (ref 15–37)
BILIRUB SERPL-MCNC: 0.5 MG/DL (ref 0–1.2)
BUN SERPL-MCNC: 13 MG/DL (ref 8–23)
CALCIUM SERPL-MCNC: 9.5 MG/DL (ref 8.8–10.2)
CHLORIDE SERPL-SCNC: 98 MMOL/L (ref 98–107)
CHOLEST SERPL-MCNC: 119 MG/DL (ref 0–200)
CO2 SERPL-SCNC: 25 MMOL/L (ref 20–29)
CREAT SERPL-MCNC: 1.05 MG/DL (ref 0.8–1.3)
CREAT UR-MCNC: 226 MG/DL (ref 39–259)
GLOBULIN SER CALC-MCNC: 3.1 G/DL (ref 2.3–3.5)
GLUCOSE SERPL-MCNC: 172 MG/DL (ref 70–99)
HDLC SERPL-MCNC: 46 MG/DL (ref 40–60)
HDLC SERPL: 2.6 (ref 0–5)
LDLC SERPL CALC-MCNC: 32 MG/DL (ref 0–100)
MICROALBUMIN UR-MCNC: <1.2 MG/DL (ref 0–20)
MICROALBUMIN/CREAT UR-RTO: NORMAL MG/G (ref 0–30)
POTASSIUM SERPL-SCNC: 4.4 MMOL/L (ref 3.5–5.1)
PROT SERPL-MCNC: 7.1 G/DL (ref 6.3–8.2)
SODIUM SERPL-SCNC: 137 MMOL/L (ref 136–145)
TRIGL SERPL-MCNC: 209 MG/DL (ref 0–150)
TSH, 3RD GENERATION: 3.85 UIU/ML (ref 0.27–4.2)
VLDLC SERPL CALC-MCNC: 42 MG/DL (ref 6–23)

## 2024-11-12 LAB — PSA SERPL DL<=0.01 NG/ML-MCNC: 1.14 NG/ML (ref 0–4)

## 2024-11-19 NOTE — PROGRESS NOTES
Maykel Hdez (: 1955) is a 68 y.o. male, an established patient, is here for evaluation of the following chief complaint(s):  Chief Complaint   Patient presents with    Medicare AWV    Hypertension    Cholesterol Problem     HLD Mgt    Results          ASSESSMENT/PLAN:  Maykel was seen today for medicare awv, hypertension, cholesterol problem and results.    Diagnoses and all orders for this visit:    Welcome to Medicare preventive visit    Essential hypertension, benign  -     olmesartan (BENICAR) 40 MG tablet; Take 1 tablet by mouth daily    Dyslipidemia    Depression, controlled    Alcohol dependence, daily use (HCC)    Hyperglycemia  -     Hemoglobin A1C; Future  -     Hemoglobin A1C    Needs flu shot  -     Influenza, FLUAD Trivalent, (age 65 y+), IM, Preservative Free, 0.5mL    BPH with obstruction/lower urinary tract symptoms  -     Armodafinil (NUVIGIL) 150 MG TABS tablet; Take 1 tablet by mouth daily for 180 days. 1 po QAM --For Excessive Daytime Somnolence Max Daily Amount: 150 mg  -     tamsulosin (FLOMAX) 0.4 MG capsule; Take 1 capsule by mouth daily    Major depressive disorder, single episode, moderate (HCC)    Moderate major depression (HCC)  -     DULoxetine (CYMBALTA) 60 MG extended release capsule; Take 1 capsule by mouth daily Take w/ 30mg Cymbalta daily, for a total daily dose of 90 mg.    Other orders  -     atorvastatin (LIPITOR) 80 MG tablet; Take 1 tablet by mouth daily  -     metoprolol succinate (TOPROL XL) 50 MG extended release tablet; Take 1 tablet by mouth daily      I reviewed recent lab results w/  Mr. dHez.      PSA is treated to goal.    Renal function is within normal limits.    The ASCVD Risk score (Rochester DK, et al., 2019) failed to calculate for the following reasons:    The patient has a prior MI or stroke diagnosis  Currently prescribed:   Lipid Lowering Medications       HMG CoA Reductase Inhibitors       atorvastatin (LIPITOR) 80 MG tablet Take 1 tablet by

## 2024-11-21 ENCOUNTER — OFFICE VISIT (OUTPATIENT)
Dept: FAMILY MEDICINE CLINIC | Facility: CLINIC | Age: 69
End: 2024-11-21

## 2024-11-21 VITALS
TEMPERATURE: 98.4 F | DIASTOLIC BLOOD PRESSURE: 70 MMHG | OXYGEN SATURATION: 96 % | BODY MASS INDEX: 31.81 KG/M2 | RESPIRATION RATE: 20 BRPM | WEIGHT: 227.2 LBS | HEART RATE: 116 BPM | SYSTOLIC BLOOD PRESSURE: 110 MMHG | HEIGHT: 71 IN

## 2024-11-21 DIAGNOSIS — F32.1 MAJOR DEPRESSIVE DISORDER, SINGLE EPISODE, MODERATE (HCC): ICD-10-CM

## 2024-11-21 DIAGNOSIS — E78.5 DYSLIPIDEMIA: ICD-10-CM

## 2024-11-21 DIAGNOSIS — N13.8 BPH WITH OBSTRUCTION/LOWER URINARY TRACT SYMPTOMS: ICD-10-CM

## 2024-11-21 DIAGNOSIS — F32.1 MODERATE MAJOR DEPRESSION (HCC): ICD-10-CM

## 2024-11-21 DIAGNOSIS — N40.1 BPH WITH OBSTRUCTION/LOWER URINARY TRACT SYMPTOMS: ICD-10-CM

## 2024-11-21 DIAGNOSIS — Z00.00 WELCOME TO MEDICARE PREVENTIVE VISIT: Primary | ICD-10-CM

## 2024-11-21 DIAGNOSIS — Z23 NEEDS FLU SHOT: ICD-10-CM

## 2024-11-21 DIAGNOSIS — R73.9 HYPERGLYCEMIA: ICD-10-CM

## 2024-11-21 DIAGNOSIS — Z71.89 ACP (ADVANCE CARE PLANNING): ICD-10-CM

## 2024-11-21 DIAGNOSIS — F32.A DEPRESSION, CONTROLLED: ICD-10-CM

## 2024-11-21 DIAGNOSIS — I10 ESSENTIAL HYPERTENSION, BENIGN: ICD-10-CM

## 2024-11-21 DIAGNOSIS — F10.20 ALCOHOL DEPENDENCE, DAILY USE (HCC): ICD-10-CM

## 2024-11-21 LAB
EST. AVERAGE GLUCOSE BLD GHB EST-MCNC: 124 MG/DL
HBA1C MFR BLD: 6 % (ref 0–5.6)

## 2024-11-21 RX ORDER — ATORVASTATIN CALCIUM 80 MG/1
80 TABLET, FILM COATED ORAL DAILY
Qty: 90 TABLET | Refills: 1 | Status: SHIPPED | OUTPATIENT
Start: 2024-11-21 | End: 2025-11-21

## 2024-11-21 RX ORDER — METOPROLOL SUCCINATE 50 MG/1
50 TABLET, EXTENDED RELEASE ORAL DAILY
Qty: 90 TABLET | Refills: 1 | Status: SHIPPED | OUTPATIENT
Start: 2024-11-21

## 2024-11-21 RX ORDER — ARMODAFINIL 150 MG/1
150 TABLET ORAL DAILY
Qty: 90 TABLET | Refills: 1 | Status: SHIPPED | OUTPATIENT
Start: 2024-11-21 | End: 2025-05-20

## 2024-11-21 RX ORDER — DULOXETIN HYDROCHLORIDE 60 MG/1
60 CAPSULE, DELAYED RELEASE ORAL DAILY
Qty: 90 CAPSULE | Refills: 1 | Status: SHIPPED | OUTPATIENT
Start: 2024-11-21

## 2024-11-21 RX ORDER — OLMESARTAN MEDOXOMIL 40 MG/1
40 TABLET ORAL DAILY
Qty: 90 TABLET | Refills: 1 | Status: SHIPPED | OUTPATIENT
Start: 2024-11-21

## 2024-11-21 RX ORDER — TAMSULOSIN HYDROCHLORIDE 0.4 MG/1
0.4 CAPSULE ORAL DAILY
Qty: 90 CAPSULE | Refills: 1 | Status: SHIPPED | OUTPATIENT
Start: 2024-11-21

## 2024-11-21 ASSESSMENT — PATIENT HEALTH QUESTIONNAIRE - PHQ9
SUM OF ALL RESPONSES TO PHQ QUESTIONS 1-9: 8
SUM OF ALL RESPONSES TO PHQ QUESTIONS 1-9: 8
3. TROUBLE FALLING OR STAYING ASLEEP: MORE THAN HALF THE DAYS
SUM OF ALL RESPONSES TO PHQ QUESTIONS 1-9: 8
9. THOUGHTS THAT YOU WOULD BE BETTER OFF DEAD, OR OF HURTING YOURSELF: NOT AT ALL
7. TROUBLE CONCENTRATING ON THINGS, SUCH AS READING THE NEWSPAPER OR WATCHING TELEVISION: NOT AT ALL
2. FEELING DOWN, DEPRESSED OR HOPELESS: MORE THAN HALF THE DAYS
4. FEELING TIRED OR HAVING LITTLE ENERGY: NEARLY EVERY DAY
SUM OF ALL RESPONSES TO PHQ QUESTIONS 1-9: 8
1. LITTLE INTEREST OR PLEASURE IN DOING THINGS: NOT AT ALL
5. POOR APPETITE OR OVEREATING: NOT AT ALL
SUM OF ALL RESPONSES TO PHQ9 QUESTIONS 1 & 2: 2
6. FEELING BAD ABOUT YOURSELF - OR THAT YOU ARE A FAILURE OR HAVE LET YOURSELF OR YOUR FAMILY DOWN: SEVERAL DAYS
10. IF YOU CHECKED OFF ANY PROBLEMS, HOW DIFFICULT HAVE THESE PROBLEMS MADE IT FOR YOU TO DO YOUR WORK, TAKE CARE OF THINGS AT HOME, OR GET ALONG WITH OTHER PEOPLE: EXTREMELY DIFFICULT
8. MOVING OR SPEAKING SO SLOWLY THAT OTHER PEOPLE COULD HAVE NOTICED. OR THE OPPOSITE, BEING SO FIGETY OR RESTLESS THAT YOU HAVE BEEN MOVING AROUND A LOT MORE THAN USUAL: NOT AT ALL

## 2024-11-21 ASSESSMENT — LIFESTYLE VARIABLES
HOW OFTEN DO YOU HAVE A DRINK CONTAINING ALCOHOL: 4 OR MORE TIMES A WEEK
HOW OFTEN DURING THE LAST YEAR HAVE YOU FAILED TO DO WHAT WAS NORMALLY EXPECTED FROM YOU BECAUSE OF DRINKING: NEVER
HOW OFTEN DURING THE LAST YEAR HAVE YOU BEEN UNABLE TO REMEMBER WHAT HAPPENED THE NIGHT BEFORE BECAUSE YOU HAD BEEN DRINKING: NEVER
HOW OFTEN DURING THE LAST YEAR HAVE YOU NEEDED AN ALCOHOLIC DRINK FIRST THING IN THE MORNING TO GET YOURSELF GOING AFTER A NIGHT OF HEAVY DRINKING: NEVER
HOW OFTEN DURING THE LAST YEAR HAVE YOU HAD A FEELING OF GUILT OR REMORSE AFTER DRINKING: LESS THAN MONTHLY
HOW OFTEN DURING THE LAST YEAR HAVE YOU FOUND THAT YOU WERE NOT ABLE TO STOP DRINKING ONCE YOU HAD STARTED: LESS THAN MONTHLY
HOW MANY STANDARD DRINKS CONTAINING ALCOHOL DO YOU HAVE ON A TYPICAL DAY: 7 TO 9
HAVE YOU OR SOMEONE ELSE BEEN INJURED AS A RESULT OF YOUR DRINKING: NO
HAS A RELATIVE, FRIEND, DOCTOR, OR ANOTHER HEALTH PROFESSIONAL EXPRESSED CONCERN ABOUT YOUR DRINKING OR SUGGESTED YOU CUT DOWN: NO

## 2024-11-21 NOTE — PROGRESS NOTES
Maykel Hdez (: 1955) is a 68 y.o. male, an established patient, is here for evaluation of the following chief complaint(s):  Chief Complaint   Patient presents with    Medicare AWV    Hypertension    Cholesterol Problem     HLD Mgt    Results       ASSESSMENT/PLAN:  Maykel was seen today for medicare awv, hypertension, cholesterol problem and results.    Diagnoses and all orders for this visit:    Welcome to Medicare preventive visit    Essential hypertension, benign  -     olmesartan (BENICAR) 40 MG tablet; Take 1 tablet by mouth daily    Dyslipidemia    Depression, controlled    Alcohol dependence, daily use (HCC)    Hyperglycemia  -     Hemoglobin A1C; Future  -     Hemoglobin A1C    Needs flu shot  -     Influenza, FLUAD Trivalent, (age 65 y+), IM, Preservative Free, 0.5mL    BPH with obstruction/lower urinary tract symptoms  -     Armodafinil (NUVIGIL) 150 MG TABS tablet; Take 1 tablet by mouth daily for 180 days. 1 po QAM --For Excessive Daytime Somnolence Max Daily Amount: 150 mg  -     tamsulosin (FLOMAX) 0.4 MG capsule; Take 1 capsule by mouth daily    Major depressive disorder, single episode, moderate (HCC)    Moderate major depression (HCC)  -     DULoxetine (CYMBALTA) 60 MG extended release capsule; Take 1 capsule by mouth daily Take w/ 30mg Cymbalta daily, for a total daily dose of 90 mg.    Other orders  -     atorvastatin (LIPITOR) 80 MG tablet; Take 1 tablet by mouth daily  -     metoprolol succinate (TOPROL XL) 50 MG extended release tablet; Take 1 tablet by mouth daily      I reviewed recent lab results w/  Mr. Hdez.      PSA is treated to goal.    Renal function is within normal limits.    The ASCVD Risk score (Gary DK, et al., 2019) failed to calculate for the following reasons:    The patient has a prior MI or stroke diagnosis  Currently prescribed:   Lipid Lowering Medications       HMG CoA Reductase Inhibitors       atorvastatin (LIPITOR) 80 MG tablet Take 1 tablet by mouth

## 2024-11-21 NOTE — PATIENT INSTRUCTIONS
get a healthy amount of sleep.  Follow-up care is a key part of your treatment and safety. Be sure to make and go to all appointments, and call your doctor if you are having problems. It's also a good idea to know your test results and keep a list of the medicines you take.  How can you care for yourself at home?  Diet    Use less salt when you cook and eat. This helps lower your blood pressure. Taste food before salting. Add only a little salt when you think you need it. With time, your taste buds will adjust to less salt.     Eat fewer snack items, fast foods, canned soups, and other high-salt, high-fat, processed foods.     Read food labels and try to avoid saturated and trans fats. They increase your risk of heart disease by raising cholesterol levels.     Limit the amount of solid fat--butter, margarine, and shortening--you eat. Use olive, peanut, or canola oil when you cook. Bake, broil, and steam foods instead of frying them.     Eat a variety of fruit and vegetables every day. Dark green, deep orange, red, or yellow fruits and vegetables are especially good for you. Examples include spinach, carrots, peaches, and berries.     Foods high in fiber can reduce your cholesterol and provide important vitamins and minerals. High-fiber foods include whole-grain cereals and breads, oatmeal, beans, brown rice, citrus fruits, and apples.     Eat lean proteins. Heart-healthy proteins include seafood, lean meats and poultry, eggs, beans, peas, nuts, seeds, and soy products.     Limit drinks and foods with added sugar. These include candy, desserts, and soda pop.   Heart-healthy lifestyle    If your doctor recommends it, get more exercise. For many people, walking is a good choice. Or you may want to swim, bike, or do other activities. Bit by bit, increase the time you're active every day. Try for at least 30 minutes on most days of the week.     Try to quit or cut back on using tobacco and other nicotine products. This

## 2024-11-22 DIAGNOSIS — I10 ESSENTIAL HYPERTENSION, BENIGN: Primary | ICD-10-CM

## 2024-11-22 DIAGNOSIS — R73.03 PREDIABETES: ICD-10-CM

## 2024-11-22 DIAGNOSIS — E78.5 DYSLIPIDEMIA: ICD-10-CM

## 2025-02-24 ENCOUNTER — OFFICE VISIT (OUTPATIENT)
Dept: FAMILY MEDICINE CLINIC | Facility: CLINIC | Age: 70
End: 2025-02-24
Payer: MEDICARE

## 2025-02-24 VITALS
BODY MASS INDEX: 31.53 KG/M2 | OXYGEN SATURATION: 99 % | TEMPERATURE: 98 F | DIASTOLIC BLOOD PRESSURE: 100 MMHG | WEIGHT: 225.25 LBS | HEIGHT: 71 IN | SYSTOLIC BLOOD PRESSURE: 142 MMHG | HEART RATE: 98 BPM

## 2025-02-24 DIAGNOSIS — I10 ESSENTIAL HYPERTENSION, BENIGN: Primary | ICD-10-CM

## 2025-02-24 PROCEDURE — G8427 DOCREV CUR MEDS BY ELIG CLIN: HCPCS

## 2025-02-24 PROCEDURE — 99214 OFFICE O/P EST MOD 30 MIN: CPT

## 2025-02-24 PROCEDURE — 1123F ACP DISCUSS/DSCN MKR DOCD: CPT

## 2025-02-24 PROCEDURE — 1036F TOBACCO NON-USER: CPT

## 2025-02-24 PROCEDURE — 3077F SYST BP >= 140 MM HG: CPT

## 2025-02-24 PROCEDURE — 3080F DIAST BP >= 90 MM HG: CPT

## 2025-02-24 PROCEDURE — 3017F COLORECTAL CA SCREEN DOC REV: CPT

## 2025-02-24 PROCEDURE — 1159F MED LIST DOCD IN RCRD: CPT

## 2025-02-24 PROCEDURE — G8417 CALC BMI ABV UP PARAM F/U: HCPCS

## 2025-02-24 RX ORDER — METOPROLOL SUCCINATE 50 MG/1
50 TABLET, EXTENDED RELEASE ORAL DAILY
Qty: 90 TABLET | Refills: 1 | Status: SHIPPED | OUTPATIENT
Start: 2025-02-24

## 2025-02-24 SDOH — ECONOMIC STABILITY: FOOD INSECURITY: WITHIN THE PAST 12 MONTHS, YOU WORRIED THAT YOUR FOOD WOULD RUN OUT BEFORE YOU GOT MONEY TO BUY MORE.: NEVER TRUE

## 2025-02-24 SDOH — ECONOMIC STABILITY: FOOD INSECURITY: WITHIN THE PAST 12 MONTHS, THE FOOD YOU BOUGHT JUST DIDN'T LAST AND YOU DIDN'T HAVE MONEY TO GET MORE.: NEVER TRUE

## 2025-02-24 ASSESSMENT — PATIENT HEALTH QUESTIONNAIRE - PHQ9
3. TROUBLE FALLING OR STAYING ASLEEP: MORE THAN HALF THE DAYS
5. POOR APPETITE OR OVEREATING: NEARLY EVERY DAY
8. MOVING OR SPEAKING SO SLOWLY THAT OTHER PEOPLE COULD HAVE NOTICED. OR THE OPPOSITE, BEING SO FIGETY OR RESTLESS THAT YOU HAVE BEEN MOVING AROUND A LOT MORE THAN USUAL: NOT AT ALL
SUM OF ALL RESPONSES TO PHQ QUESTIONS 1-9: 11
4. FEELING TIRED OR HAVING LITTLE ENERGY: NEARLY EVERY DAY
1. LITTLE INTEREST OR PLEASURE IN DOING THINGS: NOT AT ALL
SUM OF ALL RESPONSES TO PHQ QUESTIONS 1-9: 11
SUM OF ALL RESPONSES TO PHQ QUESTIONS 1-9: 11
SUM OF ALL RESPONSES TO PHQ9 QUESTIONS 1 & 2: 1
SUM OF ALL RESPONSES TO PHQ QUESTIONS 1-9: 11
9. THOUGHTS THAT YOU WOULD BE BETTER OFF DEAD, OR OF HURTING YOURSELF: NOT AT ALL
7. TROUBLE CONCENTRATING ON THINGS, SUCH AS READING THE NEWSPAPER OR WATCHING TELEVISION: NOT AT ALL
2. FEELING DOWN, DEPRESSED OR HOPELESS: SEVERAL DAYS
6. FEELING BAD ABOUT YOURSELF - OR THAT YOU ARE A FAILURE OR HAVE LET YOURSELF OR YOUR FAMILY DOWN: MORE THAN HALF THE DAYS

## 2025-02-24 NOTE — PROGRESS NOTES
Maykel Hdez (: 1955) is a 69 y.o. male, established patient, here for evaluation of the following chief complaint(s):  Hypertension (Has been high at home, only recently started taking it at home. Yesterday felt confused and nauseated, BP was 180/99 and 190/110. This morning was 164/105. )       ASSESSMENT/PLAN:  1. Essential hypertension, benign  -     metoprolol succinate (TOPROL XL) 50 MG extended release tablet; Take 1 tablet by mouth daily, Disp-90 tablet, R-1Normal    Restart medication, continue Olmesartan.  F/U 4 weeks.     SUBJECTIVE/OBJECTIVE:  HPI    BP concerns:     Hypertension - stable, well controlled, takes medications as prescribed.  denies chest pain, shortness of breath, abdominal pain, nausea, vomiting, diarrhea, dizziness or fainting, headaches or vision changes.     Hypertension Medications       Beta Blockers Cardio-Selective       metoprolol succinate (TOPROL XL) 50 MG extended release tablet Take 1 tablet by mouth daily       Angiotensin II Receptor Antagonists       olmesartan (BENICAR) 40 MG tablet Take 1 tablet by mouth daily           He stopped the Metoprolol 50 mg \"months ago\". States \"It was controlled on the one medication so I stopped\". Relays he felt \"funny\" yesterday with nausea. Denies chest pain, palpitations, breathing changes, diaphoresis, vomiting, or pain radiation. Currently asymptomatic today in office. I advised that if he feels symptomatic at home again, he needs to consider EMS and ER eval. He verbalized understanding.     Will restart Metoprolol at 50 mg daily along with continued use of Olmesartan. Given BP log, goal less than 130/80 given stroke hx. F/U 4 weeks.     Vitals:    25 0949 25 0958   BP: (!) 160/104 (!) 142/100   Pulse: 98    Temp: 98 °F (36.7 °C)    SpO2: 99%    Weight: 102.2 kg (225 lb 4 oz)    Height: 1.797 m (5' 10.75\")       Physical Exam  Constitutional:       Appearance: Normal appearance. He is not ill-appearing.

## 2025-03-26 PROBLEM — F32.1 MAJOR DEPRESSIVE DISORDER, SINGLE EPISODE, MODERATE (HCC): Status: ACTIVE | Noted: 2025-03-26

## 2025-03-26 NOTE — ASSESSMENT & PLAN NOTE
BP today is: well controlled.   Currently prescribed:   Hypertension Medications       Beta Blockers Cardio-Selective       metoprolol succinate (TOPROL XL) 50 MG extended release tablet Take 1 tablet by mouth daily       Angiotensin II Receptor Antagonists       olmesartan (BENICAR) 40 MG tablet Take 1 tablet by mouth daily   I reviewed his home blood pressure log dated February 25, 2025 to March 27, 2025.  His blood pressure readings have been elevated in the 160s to 170s over high 90s to 108.  We compared his home Omron blood pressure monitor to readings were getting in the office and his Omron monitor is giving him higher readings consistent with his BP log readings.  I checked his blood pressure 3 times in the office using a manual technique and readings were within normal limits each time.      Orders:    metoprolol succinate (TOPROL XL) 50 MG extended release tablet; Take 1 tablet by mouth daily    olmesartan (BENICAR) 40 MG tablet; Take 1 tablet by mouth daily

## 2025-03-26 NOTE — ASSESSMENT & PLAN NOTE
He has cut down to 5 beers/daily, which is about 1/2 of what he was doing before.  He continues to cut down.

## 2025-03-26 NOTE — PROGRESS NOTES
Base) MCG/ACT inhaler Inhale 2 puffs into the lungs every 6 hours as needed for Wheezing INHALE 2 PUFFS BY MOUTH EVERY 4 HOURS AS NEEDED FOR WHEEZE Yes No Rodarte PA   Armodafinil (NUVIGIL) 150 MG TABS tablet Take 1 tablet by mouth daily for 180 days. 1 po QAM --For Excessive Daytime Somnolence Max Daily Amount: 150 mg Yes No Rodarte PA   atorvastatin (LIPITOR) 80 MG tablet Take 1 tablet by mouth daily Yes No Rodarte PA   tamsulosin (FLOMAX) 0.4 MG capsule Take 1 capsule by mouth daily Yes No Rodarte PA   aspirin 325 MG tablet Take 1 tablet by mouth daily Yes Provider, MD Paige Stephenson (Including outside providers/suppliers regularly involved in providing care):   Patient Care Team:  No Rodarte PA as PCP - General  No Rodarte PA as PCP - Empaneled Provider     Recommendations for Preventive Services Due: see orders and patient instructions/AVS.  Recommended screening schedule for the next 5-10 years is provided to the patient in written form: see Patient Instructions/AVS.     Reviewed and updated this visit:  Tobacco  Allergies  Meds  Problems  Sexual Hx

## 2025-03-27 ENCOUNTER — OFFICE VISIT (OUTPATIENT)
Dept: FAMILY MEDICINE CLINIC | Facility: CLINIC | Age: 70
End: 2025-03-27

## 2025-03-27 VITALS
OXYGEN SATURATION: 99 % | HEIGHT: 71 IN | RESPIRATION RATE: 16 BRPM | SYSTOLIC BLOOD PRESSURE: 130 MMHG | BODY MASS INDEX: 31.78 KG/M2 | WEIGHT: 227 LBS | HEART RATE: 78 BPM | TEMPERATURE: 98.6 F | DIASTOLIC BLOOD PRESSURE: 78 MMHG

## 2025-03-27 DIAGNOSIS — Z00.00 INITIAL MEDICARE ANNUAL WELLNESS VISIT: Primary | ICD-10-CM

## 2025-03-27 DIAGNOSIS — Z12.11 SCREENING FOR COLORECTAL CANCER: ICD-10-CM

## 2025-03-27 DIAGNOSIS — R73.03 PREDIABETES: ICD-10-CM

## 2025-03-27 DIAGNOSIS — F32.1 MAJOR DEPRESSIVE DISORDER, SINGLE EPISODE, MODERATE (HCC): ICD-10-CM

## 2025-03-27 DIAGNOSIS — N13.8 BPH WITH OBSTRUCTION/LOWER URINARY TRACT SYMPTOMS: ICD-10-CM

## 2025-03-27 DIAGNOSIS — Z71.89 ACP (ADVANCE CARE PLANNING): ICD-10-CM

## 2025-03-27 DIAGNOSIS — F10.20 ALCOHOL DEPENDENCE, DAILY USE (HCC): ICD-10-CM

## 2025-03-27 DIAGNOSIS — E78.5 DYSLIPIDEMIA: ICD-10-CM

## 2025-03-27 DIAGNOSIS — F32.1 MODERATE MAJOR DEPRESSION (HCC): ICD-10-CM

## 2025-03-27 DIAGNOSIS — I10 ESSENTIAL HYPERTENSION, BENIGN: ICD-10-CM

## 2025-03-27 DIAGNOSIS — Z12.12 SCREENING FOR COLORECTAL CANCER: ICD-10-CM

## 2025-03-27 DIAGNOSIS — J45.20 MILD INTERMITTENT ASTHMA WITHOUT COMPLICATION: ICD-10-CM

## 2025-03-27 DIAGNOSIS — N40.1 BPH WITH OBSTRUCTION/LOWER URINARY TRACT SYMPTOMS: ICD-10-CM

## 2025-03-27 LAB
ANION GAP SERPL CALC-SCNC: 10 MMOL/L (ref 7–16)
BUN SERPL-MCNC: 16 MG/DL (ref 8–23)
CALCIUM SERPL-MCNC: 9.1 MG/DL (ref 8.8–10.2)
CHLORIDE SERPL-SCNC: 100 MMOL/L (ref 98–107)
CO2 SERPL-SCNC: 23 MMOL/L (ref 20–29)
CREAT SERPL-MCNC: 1.01 MG/DL (ref 0.8–1.3)
EST. AVERAGE GLUCOSE BLD GHB EST-MCNC: 119 MG/DL
GLUCOSE SERPL-MCNC: 196 MG/DL (ref 70–99)
HBA1C MFR BLD: 5.8 % (ref 0–5.6)
POTASSIUM SERPL-SCNC: 4.1 MMOL/L (ref 3.5–5.1)
SODIUM SERPL-SCNC: 134 MMOL/L (ref 136–145)

## 2025-03-27 RX ORDER — ALBUTEROL SULFATE 90 UG/1
2 INHALANT RESPIRATORY (INHALATION) EVERY 6 HOURS PRN
Qty: 18 G | Refills: 1 | Status: SHIPPED | OUTPATIENT
Start: 2025-03-27

## 2025-03-27 RX ORDER — ARMODAFINIL 150 MG/1
150 TABLET ORAL DAILY
Qty: 90 TABLET | Refills: 1 | Status: SHIPPED | OUTPATIENT
Start: 2025-03-27 | End: 2025-09-23

## 2025-03-27 RX ORDER — ATORVASTATIN CALCIUM 80 MG/1
80 TABLET, FILM COATED ORAL DAILY
Qty: 90 TABLET | Refills: 1 | Status: SHIPPED | OUTPATIENT
Start: 2025-03-27 | End: 2026-03-27

## 2025-03-27 RX ORDER — DULOXETIN HYDROCHLORIDE 60 MG/1
60 CAPSULE, DELAYED RELEASE ORAL DAILY
Qty: 90 CAPSULE | Refills: 1 | Status: SHIPPED | OUTPATIENT
Start: 2025-03-27

## 2025-03-27 RX ORDER — TAMSULOSIN HYDROCHLORIDE 0.4 MG/1
0.4 CAPSULE ORAL DAILY
Qty: 90 CAPSULE | Refills: 1 | Status: SHIPPED | OUTPATIENT
Start: 2025-03-27

## 2025-03-27 RX ORDER — OLMESARTAN MEDOXOMIL 40 MG/1
40 TABLET ORAL DAILY
Qty: 90 TABLET | Refills: 1 | Status: SHIPPED | OUTPATIENT
Start: 2025-03-27

## 2025-03-27 RX ORDER — METOPROLOL SUCCINATE 50 MG/1
50 TABLET, EXTENDED RELEASE ORAL DAILY
Qty: 90 TABLET | Refills: 1 | Status: SHIPPED | OUTPATIENT
Start: 2025-03-27

## 2025-03-27 ASSESSMENT — PATIENT HEALTH QUESTIONNAIRE - PHQ9
5. POOR APPETITE OR OVEREATING: NOT AT ALL
SUM OF ALL RESPONSES TO PHQ QUESTIONS 1-9: 7
7. TROUBLE CONCENTRATING ON THINGS, SUCH AS READING THE NEWSPAPER OR WATCHING TELEVISION: NOT AT ALL
4. FEELING TIRED OR HAVING LITTLE ENERGY: NEARLY EVERY DAY
SUM OF ALL RESPONSES TO PHQ QUESTIONS 1-9: 7
2. FEELING DOWN, DEPRESSED OR HOPELESS: MORE THAN HALF THE DAYS
3. TROUBLE FALLING OR STAYING ASLEEP: MORE THAN HALF THE DAYS
SUM OF ALL RESPONSES TO PHQ QUESTIONS 1-9: 7
6. FEELING BAD ABOUT YOURSELF - OR THAT YOU ARE A FAILURE OR HAVE LET YOURSELF OR YOUR FAMILY DOWN: NOT AT ALL
8. MOVING OR SPEAKING SO SLOWLY THAT OTHER PEOPLE COULD HAVE NOTICED. OR THE OPPOSITE, BEING SO FIGETY OR RESTLESS THAT YOU HAVE BEEN MOVING AROUND A LOT MORE THAN USUAL: NOT AT ALL
SUM OF ALL RESPONSES TO PHQ QUESTIONS 1-9: 7
10. IF YOU CHECKED OFF ANY PROBLEMS, HOW DIFFICULT HAVE THESE PROBLEMS MADE IT FOR YOU TO DO YOUR WORK, TAKE CARE OF THINGS AT HOME, OR GET ALONG WITH OTHER PEOPLE: VERY DIFFICULT
9. THOUGHTS THAT YOU WOULD BE BETTER OFF DEAD, OR OF HURTING YOURSELF: NOT AT ALL
1. LITTLE INTEREST OR PLEASURE IN DOING THINGS: NOT AT ALL

## 2025-03-27 ASSESSMENT — LIFESTYLE VARIABLES
HOW MANY STANDARD DRINKS CONTAINING ALCOHOL DO YOU HAVE ON A TYPICAL DAY: 5 OR 6
HOW OFTEN DO YOU HAVE A DRINK CONTAINING ALCOHOL: 4 OR MORE TIMES A WEEK
HOW OFTEN DURING THE LAST YEAR HAVE YOU HAD A FEELING OF GUILT OR REMORSE AFTER DRINKING: NEVER
HOW OFTEN DURING THE LAST YEAR HAVE YOU BEEN UNABLE TO REMEMBER WHAT HAPPENED THE NIGHT BEFORE BECAUSE YOU HAD BEEN DRINKING: NEVER
HAS A RELATIVE, FRIEND, DOCTOR, OR ANOTHER HEALTH PROFESSIONAL EXPRESSED CONCERN ABOUT YOUR DRINKING OR SUGGESTED YOU CUT DOWN: YES, DURING THE PAST YEAR
HOW OFTEN DURING THE LAST YEAR HAVE YOU NEEDED AN ALCOHOLIC DRINK FIRST THING IN THE MORNING TO GET YOURSELF GOING AFTER A NIGHT OF HEAVY DRINKING: NEVER
HOW OFTEN DURING THE LAST YEAR HAVE YOU FAILED TO DO WHAT WAS NORMALLY EXPECTED FROM YOU BECAUSE OF DRINKING: NEVER
HOW OFTEN DURING THE LAST YEAR HAVE YOU FOUND THAT YOU WERE NOT ABLE TO STOP DRINKING ONCE YOU HAD STARTED: NEVER
HAVE YOU OR SOMEONE ELSE BEEN INJURED AS A RESULT OF YOUR DRINKING: NO

## 2025-03-27 NOTE — ASSESSMENT & PLAN NOTE
Chronic, at goal (stable), continue current treatment plan  Only needs to use Albuterol prn/sparingly.     Orders:    albuterol sulfate HFA (PROVENTIL;VENTOLIN;PROAIR) 108 (90 Base) MCG/ACT inhaler; Inhale 2 puffs into the lungs every 6 hours as needed for Wheezing INHALE 2 PUFFS BY MOUTH EVERY 4 HOURS AS NEEDED FOR WHEEZE

## 2025-03-27 NOTE — ASSESSMENT & PLAN NOTE
Chronic, at goal (stable), continue current treatment plan    Orders:    Armodafinil (NUVIGIL) 150 MG TABS tablet; Take 1 tablet by mouth daily for 180 days. 1 po QAM --For Excessive Daytime Somnolence Max Daily Amount: 150 mg    tamsulosin (FLOMAX) 0.4 MG capsule; Take 1 capsule by mouth daily

## 2025-03-28 ENCOUNTER — RESULTS FOLLOW-UP (OUTPATIENT)
Dept: FAMILY MEDICINE CLINIC | Facility: CLINIC | Age: 70
End: 2025-03-28

## 2025-06-02 DIAGNOSIS — F10.20 ALCOHOL DEPENDENCE, DAILY USE (HCC): ICD-10-CM

## 2025-06-02 DIAGNOSIS — E78.5 DYSLIPIDEMIA: ICD-10-CM

## 2025-06-02 DIAGNOSIS — R73.03 PREDIABETES: ICD-10-CM

## 2025-06-02 DIAGNOSIS — I10 ESSENTIAL HYPERTENSION, BENIGN: Primary | ICD-10-CM

## 2025-06-12 ENCOUNTER — LAB (OUTPATIENT)
Dept: FAMILY MEDICINE CLINIC | Facility: CLINIC | Age: 70
End: 2025-06-12

## 2025-06-12 DIAGNOSIS — F10.20 ALCOHOL DEPENDENCE, DAILY USE (HCC): ICD-10-CM

## 2025-06-12 DIAGNOSIS — I10 ESSENTIAL HYPERTENSION, BENIGN: ICD-10-CM

## 2025-06-12 DIAGNOSIS — E78.5 DYSLIPIDEMIA: ICD-10-CM

## 2025-06-12 LAB
ALBUMIN SERPL-MCNC: 4.1 G/DL (ref 3.2–4.6)
ALBUMIN/GLOB SERPL: 1.4 (ref 1–1.9)
ALP SERPL-CCNC: 94 U/L (ref 40–129)
ALT SERPL-CCNC: 45 U/L (ref 8–55)
ANION GAP SERPL CALC-SCNC: 12 MMOL/L (ref 7–16)
AST SERPL-CCNC: 37 U/L (ref 15–37)
BILIRUB SERPL-MCNC: 0.5 MG/DL (ref 0–1.2)
BUN SERPL-MCNC: 12 MG/DL (ref 8–23)
CALCIUM SERPL-MCNC: 9.3 MG/DL (ref 8.8–10.2)
CHLORIDE SERPL-SCNC: 101 MMOL/L (ref 98–107)
CHOLEST SERPL-MCNC: 117 MG/DL (ref 0–200)
CO2 SERPL-SCNC: 24 MMOL/L (ref 20–29)
CREAT SERPL-MCNC: 0.98 MG/DL (ref 0.8–1.3)
GGT SERPL-CCNC: 100 U/L (ref 8–61)
GLOBULIN SER CALC-MCNC: 2.9 G/DL (ref 2.3–3.5)
GLUCOSE SERPL-MCNC: 116 MG/DL (ref 70–99)
HDLC SERPL-MCNC: 42 MG/DL (ref 40–60)
HDLC SERPL: 2.8 (ref 0–5)
LDLC SERPL CALC-MCNC: 45 MG/DL (ref 0–100)
POTASSIUM SERPL-SCNC: 4.6 MMOL/L (ref 3.5–5.1)
PROT SERPL-MCNC: 7 G/DL (ref 6.3–8.2)
SODIUM SERPL-SCNC: 137 MMOL/L (ref 136–145)
TRIGL SERPL-MCNC: 150 MG/DL (ref 0–150)
VLDLC SERPL CALC-MCNC: 30 MG/DL (ref 6–23)

## 2025-06-13 ENCOUNTER — RESULTS FOLLOW-UP (OUTPATIENT)
Dept: FAMILY MEDICINE CLINIC | Facility: CLINIC | Age: 70
End: 2025-06-13

## 2025-06-17 NOTE — PROGRESS NOTES
Maykel Hdez (: 1955) is a 69 y.o. male, an established patient, is here for evaluation of the following chief complaint(s):  Chief Complaint   Patient presents with    Hypertension    Results    Hyperlipidemia        ASSESSMENT/PLAN:    Assessment & Plan  Essential hypertension, benign    BP today is: well controlled.    Currently prescribed:   Hypertension Medications       Beta Blockers Cardio-Selective       metoprolol succinate (TOPROL XL) 50 MG extended release tablet Take 1 tablet by mouth daily       Angiotensin II Receptor Antagonists       olmesartan (BENICAR) 40 MG tablet Take 1 tablet by mouth daily     Orders:    metoprolol succinate (TOPROL XL) 50 MG extended release tablet; Take 1 tablet by mouth daily    olmesartan (BENICAR) 40 MG tablet; Take 1 tablet by mouth daily    Dyslipidemia       Risk score cannot be calculated because patient has a medical history suggesting prior/existing ASCVD  Currently prescribed:   Lipid Lowering Medications       HMG CoA Reductase Inhibitors       atorvastatin (LIPITOR) 80 MG tablet Take 1 tablet by mouth daily       Orders:    atorvastatin (LIPITOR) 80 MG tablet; Take 1 tablet by mouth daily    Prediabetes     Lab ordered.    Orders:    Hemoglobin A1C; Future    H/O: stroke        Bilateral carotid artery stenosis   Prior imaging perfomed in 2018 showin. Less than 50% stenosis of the right internal carotid artery.  2. Less than 50% stenosis of the left internal carotid artery.    Will repeat imaging to re-evaluate due to h/o stroke.     Orders:    Vascular duplex carotid bilateral; Future    Lower extremity numbness     Discussed w/ pt that long term excessive alcohol usage can contribute to LE Neuropathy.  Encouraged continued reduction/cessation of alcohol intake.  (GGT level elevated w/ today's labs)  Sent to the lab today to check a B12/folic acid level.    Orders:    Vitamin B12; Future    Folate; Future    Encounter for colorectal cancer

## 2025-06-19 ENCOUNTER — OFFICE VISIT (OUTPATIENT)
Dept: FAMILY MEDICINE CLINIC | Facility: CLINIC | Age: 70
End: 2025-06-19
Payer: MEDICARE

## 2025-06-19 VITALS
OXYGEN SATURATION: 97 % | DIASTOLIC BLOOD PRESSURE: 70 MMHG | SYSTOLIC BLOOD PRESSURE: 138 MMHG | WEIGHT: 227.8 LBS | HEART RATE: 87 BPM | BODY MASS INDEX: 31.89 KG/M2 | TEMPERATURE: 98.6 F | HEIGHT: 71 IN

## 2025-06-19 DIAGNOSIS — N40.1 BPH WITH OBSTRUCTION/LOWER URINARY TRACT SYMPTOMS: ICD-10-CM

## 2025-06-19 DIAGNOSIS — N13.8 BPH WITH OBSTRUCTION/LOWER URINARY TRACT SYMPTOMS: ICD-10-CM

## 2025-06-19 DIAGNOSIS — Z86.73 H/O: STROKE: ICD-10-CM

## 2025-06-19 DIAGNOSIS — I65.23 BILATERAL CAROTID ARTERY STENOSIS: ICD-10-CM

## 2025-06-19 DIAGNOSIS — R73.03 PREDIABETES: ICD-10-CM

## 2025-06-19 DIAGNOSIS — Z12.11 ENCOUNTER FOR COLORECTAL CANCER SCREENING: ICD-10-CM

## 2025-06-19 DIAGNOSIS — I10 ESSENTIAL HYPERTENSION, BENIGN: Primary | ICD-10-CM

## 2025-06-19 DIAGNOSIS — R20.0 LOWER EXTREMITY NUMBNESS: ICD-10-CM

## 2025-06-19 DIAGNOSIS — E78.5 DYSLIPIDEMIA: ICD-10-CM

## 2025-06-19 DIAGNOSIS — F32.1 MODERATE MAJOR DEPRESSION (HCC): ICD-10-CM

## 2025-06-19 DIAGNOSIS — Z12.12 ENCOUNTER FOR COLORECTAL CANCER SCREENING: ICD-10-CM

## 2025-06-19 LAB
EST. AVERAGE GLUCOSE BLD GHB EST-MCNC: 116 MG/DL
FOLATE SERPL-MCNC: 28.6 NG/ML (ref 3.1–17.5)
HBA1C MFR BLD: 5.7 % (ref 0–5.6)
VIT B12 SERPL-MCNC: 488 PG/ML (ref 193–986)

## 2025-06-19 PROCEDURE — 1123F ACP DISCUSS/DSCN MKR DOCD: CPT | Performed by: PHYSICIAN ASSISTANT

## 2025-06-19 PROCEDURE — G8417 CALC BMI ABV UP PARAM F/U: HCPCS | Performed by: PHYSICIAN ASSISTANT

## 2025-06-19 PROCEDURE — 1036F TOBACCO NON-USER: CPT | Performed by: PHYSICIAN ASSISTANT

## 2025-06-19 PROCEDURE — 99214 OFFICE O/P EST MOD 30 MIN: CPT | Performed by: PHYSICIAN ASSISTANT

## 2025-06-19 PROCEDURE — 1126F AMNT PAIN NOTED NONE PRSNT: CPT | Performed by: PHYSICIAN ASSISTANT

## 2025-06-19 PROCEDURE — 1159F MED LIST DOCD IN RCRD: CPT | Performed by: PHYSICIAN ASSISTANT

## 2025-06-19 PROCEDURE — 3017F COLORECTAL CA SCREEN DOC REV: CPT | Performed by: PHYSICIAN ASSISTANT

## 2025-06-19 PROCEDURE — 3078F DIAST BP <80 MM HG: CPT | Performed by: PHYSICIAN ASSISTANT

## 2025-06-19 PROCEDURE — G2211 COMPLEX E/M VISIT ADD ON: HCPCS | Performed by: PHYSICIAN ASSISTANT

## 2025-06-19 PROCEDURE — 3075F SYST BP GE 130 - 139MM HG: CPT | Performed by: PHYSICIAN ASSISTANT

## 2025-06-19 PROCEDURE — G8427 DOCREV CUR MEDS BY ELIG CLIN: HCPCS | Performed by: PHYSICIAN ASSISTANT

## 2025-06-19 RX ORDER — ARMODAFINIL 150 MG/1
150 TABLET ORAL DAILY
Qty: 90 TABLET | Refills: 1 | Status: SHIPPED | OUTPATIENT
Start: 2025-06-19 | End: 2025-12-16

## 2025-06-19 RX ORDER — TAMSULOSIN HYDROCHLORIDE 0.4 MG/1
0.4 CAPSULE ORAL DAILY
Qty: 90 CAPSULE | Refills: 1 | Status: SHIPPED | OUTPATIENT
Start: 2025-06-19

## 2025-06-19 RX ORDER — OLMESARTAN MEDOXOMIL 40 MG/1
40 TABLET ORAL DAILY
Qty: 90 TABLET | Refills: 1 | Status: SHIPPED | OUTPATIENT
Start: 2025-06-19

## 2025-06-19 RX ORDER — METOPROLOL SUCCINATE 50 MG/1
50 TABLET, EXTENDED RELEASE ORAL DAILY
Qty: 90 TABLET | Refills: 1 | Status: SHIPPED | OUTPATIENT
Start: 2025-06-19

## 2025-06-19 RX ORDER — ATORVASTATIN CALCIUM 80 MG/1
80 TABLET, FILM COATED ORAL DAILY
Qty: 90 TABLET | Refills: 1 | Status: SHIPPED | OUTPATIENT
Start: 2025-06-19 | End: 2026-06-19

## 2025-06-19 RX ORDER — DULOXETIN HYDROCHLORIDE 60 MG/1
60 CAPSULE, DELAYED RELEASE ORAL DAILY
Qty: 90 CAPSULE | Refills: 1 | Status: SHIPPED | OUTPATIENT
Start: 2025-06-19

## 2025-06-19 NOTE — ASSESSMENT & PLAN NOTE
Risk score cannot be calculated because patient has a medical history suggesting prior/existing ASCVD  Currently prescribed:   Lipid Lowering Medications       HMG CoA Reductase Inhibitors       atorvastatin (LIPITOR) 80 MG tablet Take 1 tablet by mouth daily       Orders:    atorvastatin (LIPITOR) 80 MG tablet; Take 1 tablet by mouth daily

## 2025-06-19 NOTE — ASSESSMENT & PLAN NOTE
Meds refilled.     Orders:    Armodafinil (NUVIGIL) 150 MG TABS tablet; Take 1 tablet by mouth daily for 180 days. 1 po QAM --For Excessive Daytime Somnolence Max Daily Amount: 150 mg    tamsulosin (FLOMAX) 0.4 MG capsule; Take 1 capsule by mouth daily

## 2025-06-19 NOTE — ASSESSMENT & PLAN NOTE
BP today is: well controlled.    Currently prescribed:   Hypertension Medications       Beta Blockers Cardio-Selective       metoprolol succinate (TOPROL XL) 50 MG extended release tablet Take 1 tablet by mouth daily       Angiotensin II Receptor Antagonists       olmesartan (BENICAR) 40 MG tablet Take 1 tablet by mouth daily     Orders:    metoprolol succinate (TOPROL XL) 50 MG extended release tablet; Take 1 tablet by mouth daily    olmesartan (BENICAR) 40 MG tablet; Take 1 tablet by mouth daily

## 2025-06-19 NOTE — ASSESSMENT & PLAN NOTE
Prior imaging perfomed in 2018 showin. Less than 50% stenosis of the right internal carotid artery.  2. Less than 50% stenosis of the left internal carotid artery.    Will repeat imaging to re-evaluate due to h/o stroke.     Orders:    Vascular duplex carotid bilateral; Future

## 2025-06-20 ENCOUNTER — RESULTS FOLLOW-UP (OUTPATIENT)
Dept: FAMILY MEDICINE CLINIC | Facility: CLINIC | Age: 70
End: 2025-06-20

## 2025-06-20 DIAGNOSIS — E78.5 DYSLIPIDEMIA: Primary | ICD-10-CM

## 2025-06-20 DIAGNOSIS — R79.89 HIGH SERUM FOLIC ACID LEVEL: ICD-10-CM

## 2025-06-20 DIAGNOSIS — F10.20 ALCOHOL DEPENDENCE, DAILY USE (HCC): ICD-10-CM

## 2025-07-31 ENCOUNTER — COMMUNITY OUTREACH (OUTPATIENT)
Dept: FAMILY MEDICINE CLINIC | Facility: CLINIC | Age: 70
End: 2025-07-31